# Patient Record
Sex: FEMALE | Race: WHITE | NOT HISPANIC OR LATINO | URBAN - METROPOLITAN AREA
[De-identification: names, ages, dates, MRNs, and addresses within clinical notes are randomized per-mention and may not be internally consistent; named-entity substitution may affect disease eponyms.]

---
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
## 2020-05-26 ENCOUNTER — APPOINTMENT (OUTPATIENT)
Dept: GASTROENTEROLOGY | Facility: CLINIC | Age: 20
End: 2020-05-26
Payer: COMMERCIAL

## 2020-05-26 DIAGNOSIS — Z80.41 FAMILY HISTORY OF MALIGNANT NEOPLASM OF OVARY: ICD-10-CM

## 2020-05-26 DIAGNOSIS — K60.2 ANAL FISSURE, UNSPECIFIED: ICD-10-CM

## 2020-05-26 PROCEDURE — 99203 OFFICE O/P NEW LOW 30 MIN: CPT | Mod: 95

## 2020-05-26 NOTE — HISTORY OF PRESENT ILLNESS
[FreeTextEntry1] : PCP = Dr. Zavala\par \par Finished semester, at [employer], econ major\par [place]\par \par h/o hemorrhoids 2019 - had severe rectal pain\par went to ER in [place] (while traveling), received an enema; saw blood with explosion\par \par On sertraline from Dr. Wanda To\par \par 9 days ago had bad stomach pains - epigastric area - doubled over\par started taking Prilosec - helped - also has TUMS and Mylanta\par was hard to function\par [place] and became lower abd pain\par saw BRBPR - only on tp\par still has blood on tp\par had BM now - painful, no blood - looked spongy, not firm, flaking\par \par takes Excedrin rarely for HAs\par \par Prior, would have regular BMs, occ constipation\par \par Presently feels fine; but had pain across the lower abd - resolved with BM\par \par No fevers or night sweats (did have night sweats with sertraline)\par Weight loss 2 summers ago to last year (from 125 - 103), maybe related to anxiety\par stable weight over the last year\par

## 2020-05-26 NOTE — REASON FOR VISIT
[Home] : at home, [unfilled] , at the time of the visit. [Initial Evaluation] : an initial evaluation [Medical Office: ([facility])___] : at the medical office located in

## 2020-05-26 NOTE — ASSESSMENT
[FreeTextEntry1] : \par lower abd pain prior to BMs for the past 9 days; began with epig pain that responded to PPIs\par Has painful BMs, sharp ("like spikes")\par \par Feels fine after BM\par Saw some BRB on tp; had hemorrhoid last year\par \par Try Miralax\par Sitz baths\par Anucort\par

## 2021-02-14 ENCOUNTER — TRANSCRIPTION ENCOUNTER (OUTPATIENT)
Age: 21
End: 2021-02-14

## 2021-11-18 ENCOUNTER — TRANSCRIPTION ENCOUNTER (OUTPATIENT)
Age: 21
End: 2021-11-18

## 2021-11-24 ENCOUNTER — APPOINTMENT (OUTPATIENT)
Dept: SURGERY | Facility: CLINIC | Age: 21
End: 2021-11-24
Payer: COMMERCIAL

## 2021-11-24 VITALS
WEIGHT: 110 LBS | OXYGEN SATURATION: 99 % | HEART RATE: 63 BPM | DIASTOLIC BLOOD PRESSURE: 67 MMHG | HEIGHT: 60.5 IN | BODY MASS INDEX: 21.04 KG/M2 | SYSTOLIC BLOOD PRESSURE: 114 MMHG | RESPIRATION RATE: 15 BRPM | TEMPERATURE: 97.8 F

## 2021-11-24 DIAGNOSIS — Z78.9 OTHER SPECIFIED HEALTH STATUS: ICD-10-CM

## 2021-11-24 DIAGNOSIS — K64.8 OTHER HEMORRHOIDS: ICD-10-CM

## 2021-11-24 DIAGNOSIS — K64.9 UNSPECIFIED HEMORRHOIDS: ICD-10-CM

## 2021-11-24 PROCEDURE — 46600 DIAGNOSTIC ANOSCOPY SPX: CPT

## 2021-11-24 PROCEDURE — 99244 OFF/OP CNSLTJ NEW/EST MOD 40: CPT | Mod: 25

## 2021-11-24 RX ORDER — SERTRALINE HYDROCHLORIDE 50 MG/1
50 TABLET, FILM COATED ORAL
Refills: 0 | Status: DISCONTINUED | COMMUNITY
End: 2021-11-24

## 2021-11-24 RX ORDER — POLYETHYLENE GLYCOL 3350 17 G/17G
17 POWDER, FOR SOLUTION ORAL DAILY
Qty: 1 | Refills: 5 | Status: DISCONTINUED | COMMUNITY
Start: 2020-05-26 | End: 2021-11-24

## 2021-11-24 RX ORDER — HYDROCORTISONE 2.5% 25 MG/G
2.5 CREAM TOPICAL
Qty: 1 | Refills: 0 | Status: ACTIVE | COMMUNITY
Start: 2021-11-24 | End: 1900-01-01

## 2021-11-24 RX ORDER — HYDROCORTISONE 2.5% 25 MG/G
2.5 CREAM TOPICAL
Qty: 1 | Refills: 0 | Status: DISCONTINUED | COMMUNITY
Start: 2020-05-26 | End: 2021-11-24

## 2021-12-28 PROBLEM — K64.8 INTERNAL AND EXTERNAL PROLAPSED HEMORRHOIDS: Status: ACTIVE | Noted: 2021-12-28

## 2021-12-28 NOTE — CONSULT LETTER
[Dear  ___] : Dear ~RODNEY, [Consult Letter:] : I had the pleasure of evaluating your patient, [unfilled]. [Please see my note below.] : Please see my note below. [Consult Closing:] : Thank you very much for allowing me to participate in the care of this patient.  If you have any questions, please do not hesitate to contact me. [Sincerely,] : Sincerely, [FreeTextEntry2] : Eladio Viramontes [FreeTextEntry3] : Marisel Marroquin M.D., F.A.C.S., F.GAGE.S.C.R.S.\par Assistant Professor of Surgery\par [employer] [facility]\par \par

## 2021-12-28 NOTE — PHYSICAL EXAM
[FreeTextEntry1] : This is a 21 year-old well-developed female in no apparent distress.\par \par HEENT normocephalic, anicteric, external ears normal bilaterally, EOMs intact.\par \par Cardiac - regular rate and rhythm.\par \par Abdomen soft, nontender, nondistended, no masses. No hepatosplenomegaly.\par \par No inguinal lymphadenopathy bilaterally.\par \par Examination of the perineum reveals small right anterolateral and left lateral external hemorrhoids. Digital rectal examination reveals normal sphincter tone. \par Anoscopy reveals moderately enlarged three-quadrant internal hemorrhoids, largest one in the right anterolateral area, all situated in the distal anal canal and not amenable to rubber banding.\par \par Neuro-cranial nerves grossly intact. Normal gait.\par \par Psychiatric-oriented to time place and person. Good understanding of conversation.

## 2021-12-28 NOTE — HISTORY OF PRESENT ILLNESS
[FreeTextEntry1] : Sarah is a 22 y/o female here for evaluation of anorectal pain and swelling. \par \par Patient feels  prolapsing tissue  noted  for the past month causing her discomfort. She is able to manually reduce the tissue back. The tissue has gotten larger in the past 3 weeks. Complains anal pruritus worse at night and uses Preparation H daily with little relief of symptoms. Has formed BM 2-3 day.   Denies bleeding, draining, and nausea and vomiting.  Denies abdominal pain. No family history of [place]/Rectal CA. Denies the use of anticoagulants. She has never had a colonoscopy. \tessy Was treated for fissure symptoms in 2020 by GI. \par \par

## 2021-12-28 NOTE — ASSESSMENT
[FreeTextEntry1] : 22 yo female with symptomatic external and internal prolapsing hemorrhoids. In order to treat her disease, she needs an excisional hemorrhoidectomy, which unfortunately has a painful recovery. I discussed the details, risks, benefits and alternatives of the procedure and expectations of recovery with the patient. \par Script for hemorrhoidal ointment provided. \par Pt to call my office if she wishes to proceed with the surgery.\par RTO as needed. \par

## 2022-08-17 ENCOUNTER — NON-APPOINTMENT (OUTPATIENT)
Age: 22
End: 2022-08-17

## 2023-06-05 ENCOUNTER — INPATIENT (INPATIENT)
Facility: HOSPITAL | Age: 23
LOS: 7 days | Discharge: HOME | End: 2023-06-13
Attending: PSYCHIATRY & NEUROLOGY | Admitting: PSYCHIATRY & NEUROLOGY
Payer: COMMERCIAL

## 2023-06-05 VITALS
DIASTOLIC BLOOD PRESSURE: 112 MMHG | OXYGEN SATURATION: 100 % | TEMPERATURE: 99 F | RESPIRATION RATE: 16 BRPM | SYSTOLIC BLOOD PRESSURE: 138 MMHG | HEART RATE: 104 BPM

## 2023-06-05 PROCEDURE — 99285 EMERGENCY DEPT VISIT HI MDM: CPT

## 2023-06-05 NOTE — ED PROVIDER NOTE - OBJECTIVE STATEMENT
This is a 23-year-old female with no pertinent past history with complaint of increased anxiety, pressured and rapid speech, flight of ideas. Patient arrived after her mental health counselor Any checked on her today and she advised family to bring her er due to possible she has her 1st manic episode.   Patient reports she believes her family think she is crazy but she is not. She identifies her  stressors as her uncle passed on May 29 this year. 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 She sees the "lights" she loves everybody. She is a love bug and people do not understand that. She endorses hx of self injury at 7 th grade ( via cutting), she wanted to relive mental pain, she was frustrated at the time. She was confused about her sexuality. At this time she is between relationship with a woman. Denies any si intent or plan, hi, ah, vh. Reports less sleep about 3 hrs during the night. Usually she sleeps 6-10 hrs.

## 2023-06-05 NOTE — ED BEHAVIORAL HEALTH ASSESSMENT NOTE - NSBHROSSYSTEMS_PSY_ALL_CORE
Psychiatric t currently denied experiencing any headaches; has no dizziness, no blurring of vision; no sorethroat; no cough, no fever. no chest pains, no SOB, no palpitations, no abdominal pains, no nausea/ vomiting/ diarrhea, no dysuria, no hesitancy, no arthralgia/ no pruritus. denied any muscle/ joint pains/Psychiatric

## 2023-06-05 NOTE — ED PROVIDER NOTE - CLINICAL SUMMARY MEDICAL DECISION MAKING FREE TEXT BOX
This is a 23-year-old female with no pertinent past history with complaint of increased anxiety, pressured and rapid speech, flight of ideas. Patient arrived after her mental health counselor Any checked on her today and she advised family to bring her er due to possible she has her 1st manic episode.   Patient reports she believes her family think she is crazy but she is not. She identifies her  stressors as her uncle passed on May 29 this year. 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 She sees the "lights" she loves everybody. She is a love bug and people do not understand that. She endorses hx of self injury at 7 th grade ( via cutting), she wanted to relive mental pain, she was frustrated at the time. She was confused about her sexuality. At this time she is between relationship with a woman. Denies any si intent or plan, hi, ah, vh. Reports less sleep about 3 hrs during the night. Usually she sleeps 6-10 hrs.  labs, psych consult

## 2023-06-05 NOTE — ED BEHAVIORAL HEALTH ASSESSMENT NOTE - NSBHATTESTCOMMENTATTENDFT_PSY_A_CORE
23/F with reported hx of anxiety; no reported hx of SA and hx of polysubstance abuse. now presents due to concern for reyna manifesting as agitated behavior.      at this time, is exhibiting manic symptoms in the form of illogicality/ FOI/ tangentiality; she is severely affectively dysregulated and remains unpredictable. is delusional (grandiose, referential delusions); has magical thinking; with poor insight and impaired judgement. said symptoms causing severe functional impairment.  Pt will need psych admission aimed at stabilization     Recommendations:   - start lithium 900mg PO daily. titrate. obtain rpt Li level later    - PRN PO/IM haloperidol 5mg, lorazepam 2mg, and diphenhydramine 50mg PO/IM q6hrs for agitation  - once medically cleared, facilitate transfer to [facility]

## 2023-06-05 NOTE — ED BEHAVIORAL HEALTH ASSESSMENT NOTE - RISK ASSESSMENT
Acute: impulsivity; affective dysregulation with lability; increased activity and reckless behaviors  Chronic: daily substance use (marijuana and alcohol); history of NSSIB  Protective: supportive family; residential stability; employment; denial of current and historical SIIP, HIIP, and AVH

## 2023-06-05 NOTE — ED BEHAVIORAL HEALTH NOTE - BEHAVIORAL HEALTH NOTE
As per request of provider, writer contacted patient’s mother Susanna Huff [phone] to obtain collateral information. The following information is per mother.    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 Mother reports patient found out today that the patient has sent around 64 text messages since Saturday night at midnight to a coworker that she has a crush on. Mothe reports patient has made statements being enlightened and mother describes the text as a stream of conscious. Mother reports patients texed included the patient stating “I have been awake since midnight thinking about you”, talks about seeing the #’s 7 and 5 and that they are signs from angels. Mother reports patient states in thetext that she has nothing to fear, is talking about god and saying her uncle jono who recently passed has been talking to her since she passed. Mother reports the coworker told the mother today about the tetxs. Mother reports patient went to work today for a half of day and said she wasn’t going into work tomorrow. Mother reports patient says she has not slept since Saturday. She says patient’s hygiene and appetite are at baseline. Mother reports patient was agitated today yelling at family during the conversations. Mother reports beign attacked verbally by the patient on the way here. Mother reports at baseline, the patient is punctual, friendly and not verbally aggressive. Mother reports patient smokes marijuana often and has been dirnking a 6 pack of beer a day for the past 1-2 weeks. Mother reports patient has a hx of self harm but is unaware of any recent self. Mother reports patient has no prior psych admissions. Mother reports patient’s medication includes Zoloft and Wellbutrin. Mother is unsure of dosage. patient’s psychiatrist is Dr lopez ([phone]) and therapist is Any Rivera *[phone]). Mother reports patient has no medical problems. Patient is covid vaccinated and has no recent travel or exposure. Patient has no family hx of mental illness or addiction. Patient does not have access to firearms or weapons. Mother in waiting room awaiting dispo. As per request of provider, writer contacted patient’s mother Susanna Huff [phone] to obtain collateral information. The following information is per mother.    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 Mother reports patient found out today that the patient has sent around 64 text messages since Saturday night at midnight to a coworker that she has a crush on. Mother reports patient has made statements being enlightened and mother describes the text as a stream of conscious. Mother reports patients texed included the patient stating “I have been awake since midnight thinking about you”, talks about seeing the #’s 7 and 5 and that they are signs from angels. Mother reports patient states in the text that she has nothing to fear, is talking about god and saying her uncle jono who recently passed has been talking to her since she passed. Mother reports the coworker told the mother today about the texts. Mother reports patient went to work today for a half of day and said she wasn’t going into work tomorrow. Mother reports patient says she has not slept since Saturday. She says patient’s hygiene and appetite are at baseline. Mother reports patient was agitated today yelling at family during the conversations. Mother reports being attacked verbally by the patient on the way here. Mother reports at baseline, the patient is punctual, friendly and not verbally aggressive. Mother reports patient smokes marijuana often and has been drinking a 6 pack of beer a day for the past 1-2 weeks. Mother reports patient has a hx of self harm but is unaware of any recent self. Mother reports patient has no prior psych admissions. Mother reports patient’s medication includes Zoloft and Wellbutrin. Mother is unsure of dosage. patient’s psychiatrist is Dr lopez ([phone]) and therapist is Any Rivera ([phone]). Mother reports patient has no medical problems. Patient is covid vaccinated and has no recent travel or exposure. Patient has no family hx of mental illness or addiction. Patient does not have access to firearms or weapons. Mother in waiting room awaiting disposition.   Writer informed mother that patient would be admitted and is boarding due to bed availability.    As per request of provider, writer contacted  Any Rivera (mental health counselor) [phone] for additional collateral information. Writer left a voicemail requesting return call.

## 2023-06-05 NOTE — ED BEHAVIORAL HEALTH ASSESSMENT NOTE - NSSUICPROTFACT_PSY_ALL_CORE
Identifies reasons for living/Supportive social network of family or friends/Engaged in work or school/Positive therapeutic relationships/Beloved pets

## 2023-06-05 NOTE — ED BEHAVIORAL HEALTH ASSESSMENT NOTE - UNABLE TO CARE FOR SELF DETAILS
no
lack of insight into her condition; decreased sleep with increased reckless behavior putting the patient at risk

## 2023-06-05 NOTE — ED PROVIDER NOTE - PROGRESS NOTE DETAILS
Dr. Sam Martinez DO (ED ATTENDING):  patient is medically cleared for admission to inpatient psych and currently boarding at this time for a [facility] bed

## 2023-06-05 NOTE — ED BEHAVIORAL HEALTH ASSESSMENT NOTE - SUMMARY
This is a 24y/o woman with a past psychiatric history of anxiety presenting with reyna.     While in the ED, the patient has been calm and cooperative, although emotionally labile with irritability. On exam, she is hyperverbal, pressured, grandiose, labile, and impulsive, and is experiencing referential delusions, magical thinking, decreased need for sleep, and increased goal-directed behavior, all of which has lasted for at least the last week. Given this, she meets criteria for bipolar I disorder, current manic episode. In her current state, she presents a risk of harm to herself and others and, therefore, warrants inpatient psychiatric admission for acute stabilization and medication management.     Plan:  - admit 9.27  - start lithium 900mg PO daily pending CMP for acute reyna  - PRN PO/IM haloperidol 5mg, lorazepam 2mg, and diphenhydramine 50mg for agitation This is a 24y/o woman with a past psychiatric history of anxiety presenting with reyna.     While in the ED, the patient has been calm and cooperative, although emotionally labile with irritability. On exam, she is hyperverbal, pressured, grandiose, labile, and impulsive, and is experiencing referential delusions, magical thinking, decreased need for sleep, and increased goal-directed behavior, all of which has lasted for at least the last week. Given this, she meets criteria for bipolar I disorder, current manic episode. In her current state, she presents a risk of harm to herself and others and, therefore, warrants inpatient psychiatric admission for acute stabilization and medication management.     Plan:  - admit 9.27  - start lithium 900mg PO daily  for acute reyna  - PRN PO/IM haloperidol 5mg, lorazepam 2mg, and diphenhydramine 50mg for agitation

## 2023-06-05 NOTE — ED BEHAVIORAL HEALTH ASSESSMENT NOTE - DESCRIPTION
lives with family in [place], graduated college in 2021, started current job in Mar2023 none known awaiting collection of labs awaiting collection of labs. no active SI or HI. not delirious. preoccupied with discharge.

## 2023-06-05 NOTE — ED BEHAVIORAL HEALTH ASSESSMENT NOTE - HPI (INCLUDE ILLNESS QUALITY, SEVERITY, DURATION, TIMING, CONTEXT, MODIFYING FACTORS, ASSOCIATED SIGNS AND SYMPTOMS)
This is a 24y/o woman with no past medical history and a past psychiatric history of anxiety presenting at the behest of her therapist for concern of reyna.     The patient reports that she has been having difficulties since Nov2022 when the stress of her job was causing her high levels of anxiety. 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 She then started working at [employer] and reports that her situation improved slightly but that she is now in love with a coworker (11 years older,  with children) and endorses a strong connection with this person after knowing them for a few days. She states that a few weeks ago she began having difficulties sleeping, falling asleep at 10 or 11pm and waking up at 2am feeling energetic (at first she said this coincided with the death of her uncle on 29May2023 but then extended the timeline). She reports consuming more alcohol than usual, "craving the buzz." She says that since that time she has been "enlightened," receiving special messages from the environment that others don't  ("Cardinals have special meaning, and I've been seeing them everywhere;" "It's like a spiritual experience"). She reports smoking marijuana daily (2-3 bowls) for the last five years and vaping nicotine. She reports use of cocaine in college but denies current and historical use of other substances. She reports journaling all the time, much more than her usual state. She also reports that everyone around her "notices that I'm different and thinks I'm crazy." She reports that she has not had a sex drive since November. She reports that she started to spend more money than usual since March. She denies current and historical SIIP, HIIP, and AVH.    She states that she has been seeing psychiatrists and therapists off and on since 7th grade when she began having "emotional difficulties." At that time, she started cutting herself with razors, stopping a few years ago. Since Dec202, she has been seeing Dr. Wanda To, who started her on sertraline for anxiety. She was titrated to 100mg daily and reports taking it consistently. She also reports starting bupropion but has not taken it for a few weeks because her prescription ran out. She states that she was hospitalized at an inpatient psychiatric facility ([place]) in 2019 for "anxiety." She says that the circumstances of that hospitalization were not similar to her current situation. She denies a family history of psychiatric conditions. This is a 22y/o woman with no past medical history and a past psychiatric history of anxiety presenting at the behest of her therapist for concern of ryena.     The patient reports that she has been having difficulties since Nov2022 when the stress of her job was causing her high levels of anxiety. 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 She then started working at [employer] and reports that her situation improved slightly but that she is now in love with a coworker (11 years older,  with children) and endorses a strong connection with this person after knowing them for a few days. She states that a few weeks ago she began having difficulties sleeping, falling asleep at 10 or 11pm and waking up at 2am feeling energetic (at first she said this coincided with the death of her uncle on 29May2023 but then extended the timeline). She reports consuming more alcohol than usual, "craving the buzz." She says that since that time she has been "enlightened," receiving special messages from the environment that others don't  ("Cardinals have special meaning, and I've been seeing them everywhere;" "It's like a spiritual experience"). She reports smoking marijuana daily (2-3 bowls) for the last five years and vaping nicotine. She reports use of cocaine in college but denies current and historical use of other substances. She reports journaling all the time, much more than her usual state. She also reports that everyone around her "notices that I'm different and thinks I'm crazy." She reports that she has not had a sex drive since November. She reports that she started to spend more money than usual since March. She denies current and historical SIIP, HIIP, and AVH.    She states that she has been seeing psychiatrists and therapists off and on since 7th grade when she began having "emotional difficulties." At that time, she started cutting herself with razors, stopping a few years ago. Since Dec202, she has been seeing Dr. Wanda To, who started her on sertraline for anxiety. She was titrated to 100mg daily and reports taking it consistently. She also reports starting bupropion but has not taken it for a few weeks because her prescription ran out. She states that she was hospitalized at an inpatient psychiatric facility ([place]) in 2019 for "anxiety." She says that the circumstances of that hospitalization were not similar to her current situation. She denies a family history of psychiatric conditions. She does not want inpatient admission.    Collateral found in [facility] Note.

## 2023-06-05 NOTE — ED ADULT TRIAGE NOTE - CHIEF COMPLAINT QUOTE
sent in by mental health counselor for manic symptoms during visit today. Pt is hyperverbal, pressured speech, and states "I am god-like figure". Hx Bipolar

## 2023-06-06 DIAGNOSIS — F31.10 BIPOLAR DISORDER, CURRENT EPISODE MANIC WITHOUT PSYCHOTIC FEATURES, UNSPECIFIED: ICD-10-CM

## 2023-06-06 DIAGNOSIS — F33.9 MAJOR DEPRESSIVE DISORDER, RECURRENT, UNSPECIFIED: ICD-10-CM

## 2023-06-06 LAB
ALBUMIN SERPL ELPH-MCNC: 4.8 G/DL — SIGNIFICANT CHANGE UP (ref 3.3–5)
ALP SERPL-CCNC: 59 U/L — SIGNIFICANT CHANGE UP (ref 40–120)
ALT FLD-CCNC: 21 U/L — SIGNIFICANT CHANGE UP (ref 4–33)
ANION GAP SERPL CALC-SCNC: 15 MMOL/L — HIGH (ref 7–14)
APAP SERPL-MCNC: <10 UG/ML — LOW (ref 15–25)
AST SERPL-CCNC: 25 U/L — SIGNIFICANT CHANGE UP (ref 4–32)
BASOPHILS # BLD AUTO: 0.08 K/UL — SIGNIFICANT CHANGE UP (ref 0–0.2)
BASOPHILS NFR BLD AUTO: 0.9 % — SIGNIFICANT CHANGE UP (ref 0–2)
BILIRUB SERPL-MCNC: 0.5 MG/DL — SIGNIFICANT CHANGE UP (ref 0.2–1.2)
BUN SERPL-MCNC: 11 MG/DL — SIGNIFICANT CHANGE UP (ref 7–23)
CALCIUM SERPL-MCNC: 9.2 MG/DL — SIGNIFICANT CHANGE UP (ref 8.4–10.5)
CHLORIDE SERPL-SCNC: 103 MMOL/L — SIGNIFICANT CHANGE UP (ref 98–107)
CO2 SERPL-SCNC: 20 MMOL/L — LOW (ref 22–31)
CREAT SERPL-MCNC: 0.72 MG/DL — SIGNIFICANT CHANGE UP (ref 0.5–1.3)
EGFR: 120 ML/MIN/1.73M2 — SIGNIFICANT CHANGE UP
EOSINOPHIL # BLD AUTO: 0.05 K/UL — SIGNIFICANT CHANGE UP (ref 0–0.5)
EOSINOPHIL NFR BLD AUTO: 0.5 % — SIGNIFICANT CHANGE UP (ref 0–6)
ETHANOL SERPL-MCNC: 49 MG/DL — HIGH
FLUAV AG NPH QL: SIGNIFICANT CHANGE UP
FLUBV AG NPH QL: SIGNIFICANT CHANGE UP
GLUCOSE SERPL-MCNC: 88 MG/DL — SIGNIFICANT CHANGE UP (ref 70–99)
HCG SERPL-ACNC: <1 MIU/ML — SIGNIFICANT CHANGE UP
HCT VFR BLD CALC: 41.4 % — SIGNIFICANT CHANGE UP (ref 34.5–45)
HGB BLD-MCNC: 13.3 G/DL — SIGNIFICANT CHANGE UP (ref 11.5–15.5)
IANC: 6.05 K/UL — SIGNIFICANT CHANGE UP (ref 1.8–7.4)
IMM GRANULOCYTES NFR BLD AUTO: 0.2 % — SIGNIFICANT CHANGE UP (ref 0–0.9)
LYMPHOCYTES # BLD AUTO: 2.38 K/UL — SIGNIFICANT CHANGE UP (ref 1–3.3)
LYMPHOCYTES # BLD AUTO: 25.6 % — SIGNIFICANT CHANGE UP (ref 13–44)
MCHC RBC-ENTMCNC: 27.2 PG — SIGNIFICANT CHANGE UP (ref 27–34)
MCHC RBC-ENTMCNC: 32.1 GM/DL — SIGNIFICANT CHANGE UP (ref 32–36)
MCV RBC AUTO: 84.7 FL — SIGNIFICANT CHANGE UP (ref 80–100)
MONOCYTES # BLD AUTO: 0.7 K/UL — SIGNIFICANT CHANGE UP (ref 0–0.9)
MONOCYTES NFR BLD AUTO: 7.5 % — SIGNIFICANT CHANGE UP (ref 2–14)
NEUTROPHILS # BLD AUTO: 6.05 K/UL — SIGNIFICANT CHANGE UP (ref 1.8–7.4)
NEUTROPHILS NFR BLD AUTO: 65.3 % — SIGNIFICANT CHANGE UP (ref 43–77)
NRBC # BLD: 0 /100 WBCS — SIGNIFICANT CHANGE UP (ref 0–0)
NRBC # FLD: 0 K/UL — SIGNIFICANT CHANGE UP (ref 0–0)
PLATELET # BLD AUTO: 388 K/UL — SIGNIFICANT CHANGE UP (ref 150–400)
POTASSIUM SERPL-MCNC: 3.7 MMOL/L — SIGNIFICANT CHANGE UP (ref 3.5–5.3)
POTASSIUM SERPL-SCNC: 3.7 MMOL/L — SIGNIFICANT CHANGE UP (ref 3.5–5.3)
PROT SERPL-MCNC: 7.8 G/DL — SIGNIFICANT CHANGE UP (ref 6–8.3)
RBC # BLD: 4.89 M/UL — SIGNIFICANT CHANGE UP (ref 3.8–5.2)
RBC # FLD: 12.8 % — SIGNIFICANT CHANGE UP (ref 10.3–14.5)
RSV RNA NPH QL NAA+NON-PROBE: SIGNIFICANT CHANGE UP
SALICYLATES SERPL-MCNC: <0.3 MG/DL — LOW (ref 15–30)
SARS-COV-2 RNA SPEC QL NAA+PROBE: SIGNIFICANT CHANGE UP
SODIUM SERPL-SCNC: 138 MMOL/L — SIGNIFICANT CHANGE UP (ref 135–145)
TOXICOLOGY SCREEN, DRUGS OF ABUSE, SERUM RESULT: SIGNIFICANT CHANGE UP
TSH SERPL-MCNC: 4.62 UIU/ML — HIGH (ref 0.27–4.2)
WBC # BLD: 9.28 K/UL — SIGNIFICANT CHANGE UP (ref 3.8–10.5)
WBC # FLD AUTO: 9.28 K/UL — SIGNIFICANT CHANGE UP (ref 3.8–10.5)

## 2023-06-06 RX ORDER — DIPHENHYDRAMINE HCL 50 MG
50 CAPSULE ORAL ONCE
Refills: 0 | Status: DISCONTINUED | OUTPATIENT
Start: 2023-06-06 | End: 2023-06-13

## 2023-06-06 RX ORDER — LITHIUM CARBONATE 300 MG/1
900 TABLET, EXTENDED RELEASE ORAL DAILY
Refills: 0 | Status: DISCONTINUED | OUTPATIENT
Start: 2023-06-06 | End: 2023-06-07

## 2023-06-06 RX ORDER — HALOPERIDOL DECANOATE 100 MG/ML
5 INJECTION INTRAMUSCULAR EVERY 6 HOURS
Refills: 0 | Status: DISCONTINUED | OUTPATIENT
Start: 2023-06-06 | End: 2023-06-13

## 2023-06-06 RX ORDER — HALOPERIDOL DECANOATE 100 MG/ML
5 INJECTION INTRAMUSCULAR ONCE
Refills: 0 | Status: DISCONTINUED | OUTPATIENT
Start: 2023-06-06 | End: 2023-06-13

## 2023-06-06 RX ORDER — DIPHENHYDRAMINE HCL 50 MG
50 CAPSULE ORAL EVERY 6 HOURS
Refills: 0 | Status: DISCONTINUED | OUTPATIENT
Start: 2023-06-06 | End: 2023-06-13

## 2023-06-06 RX ORDER — ACETAMINOPHEN 500 MG
650 TABLET ORAL EVERY 6 HOURS
Refills: 0 | Status: DISCONTINUED | OUTPATIENT
Start: 2023-06-06 | End: 2023-06-13

## 2023-06-06 RX ORDER — MAGNESIUM HYDROXIDE 400 MG/1
30 TABLET, CHEWABLE ORAL DAILY
Refills: 0 | Status: DISCONTINUED | OUTPATIENT
Start: 2023-06-06 | End: 2023-06-13

## 2023-06-06 RX ORDER — NICOTINE POLACRILEX 2 MG
2 GUM BUCCAL
Refills: 0 | Status: DISCONTINUED | OUTPATIENT
Start: 2023-06-06 | End: 2023-06-13

## 2023-06-06 RX ORDER — HALOPERIDOL DECANOATE 100 MG/ML
5 INJECTION INTRAMUSCULAR ONCE
Refills: 0 | Status: COMPLETED | OUTPATIENT
Start: 2023-06-06 | End: 2023-06-06

## 2023-06-06 RX ADMIN — HALOPERIDOL DECANOATE 5 MILLIGRAM(S): 100 INJECTION INTRAMUSCULAR at 03:18

## 2023-06-06 RX ADMIN — Medication 2 MILLIGRAM(S): at 03:18

## 2023-06-06 NOTE — ED ADULT NURSE NOTE - OBJECTIVE STATEMENT
Received pt in room . A&Ox4, ambulatory at baseline, presents with increased anxiety, pressured and rapid speech, flight of ideas. states she believes her family think she is crazy but she is not since uncle passed on May 29 this year. states she is a "love bug" and people do not understand that. endorses hx of self injury at 7 th grade (via cutting), she wanted to relive mental pain, she was frustrated at the time. Reports less sleep about 3 hrs during the night, sleeps 6-10 hrs. VSS. RR even and unlabored. Labs sent. Pt denies SI/HI. Pt denies visual or auditory hallucinations or other complaints. Pt belongings checked and secured by staff.  Pt checked by metal detector.  Pt changed into gown and scrubs. Awaiting further orders from provider.

## 2023-06-06 NOTE — ED BEHAVIORAL HEALTH NOTE - BEHAVIORAL HEALTH NOTE
received a call from Susanna Huff  pt's mother who requested an update.   informed her pt is calm at this time and she will receive a call with an update once disposition is determined.

## 2023-06-06 NOTE — ED ADULT NURSE REASSESSMENT NOTE - NS ED NURSE REASSESS COMMENT FT1
pt became increasingly manic, hysterically crying, demanding to see mom, "I don't not belong here, I'm so anxious and scared, get me out of here", trying to hide behind staff members and security, pt accepting of IM medication without physical restraint, safety measures in place, care ongoing

## 2023-06-06 NOTE — ED BEHAVIORAL HEALTH PROGRESS NOTE - NSBHATTESTCOMMENTATTENDFT_PSY_A_CORE
This is a 22y/o woman with a past psychiatric history of anxiety presenting with reyna. While in the ED, the patient has been calm and cooperative, although emotionally labile with irritability.   On exam, patient is presenting manic, hyperverbal, pressured, grandiose, labile, and impulsive, and is experiencing referential delusions, magical thinking, decreased need for sleep, and increased goal-directed behavior, all of which has lasted for at least the last week.   Patient meets criteria for bipolar I disorder, current manic episode. In her current state, she presents a risk of harm to herself and others and, therefore, warrants inpatient psychiatric admission for acute stabilization and medication management. Patient is requesting to be admitted, but she is unable to sign in voluntarily because she lacks capacity to do so, she will admitted involuntarily.

## 2023-06-06 NOTE — BH PATIENT PROFILE - FUNCTIONAL ASSESSMENT - DAILY ACTIVITY ASSESSMENT TYPE
Called patient reported the below information.     10/25/2022 8:30 AM Priyanka Greene MD  DERMATOLOGY [phone]        Display Notes    pdt          Admission

## 2023-06-06 NOTE — ED ADULT NURSE REASSESSMENT NOTE - NS ED NURSE REASSESS COMMENT FT1
Received report from naina RN. Pt is resting with eyes closed. VS as noted. RR even and unlabored. Safety measures in place. Care ongoing

## 2023-06-06 NOTE — ED BEHAVIORAL HEALTH PROGRESS NOTE - CASE SUMMARY/FORMULATION (CLEARLY DOCUMENT RATIONALE FOR DISPOSITION CHANGE)
This is a 24y/o woman with a past psychiatric history of anxiety presenting with reyna.     Given this, she meets criteria for bipolar I disorder, current manic episode. In her current state, she presents a risk of harm to herself and others and, therefore, warrants inpatient psychiatric admission for acute stabilization and medication management.

## 2023-06-06 NOTE — ED BEHAVIORAL HEALTH PROGRESS NOTE - SUMMARY
23 year old female with increased goal-directed behavior, decreased need for sleep,  This is a 24y/o woman with a past psychiatric history of anxiety presenting with reyna.     While in the ED, the patient has been calm and cooperative, although emotionally labile with irritability. On exam, she is hyperverbal, pressured, grandiose, labile, and impulsive, and is experiencing referential delusions, magical thinking, decreased need for sleep, and increased goal-directed behavior, all of which has lasted for at least the last week. Given this, she meets criteria for bipolar I disorder, current manic episode. In her current state, she presents a risk of harm to herself and others and, therefore, warrants inpatient psychiatric admission for acute stabilization and medication management.

## 2023-06-06 NOTE — ED BEHAVIORAL HEALTH PROGRESS NOTE - NSBHMSEMUSCLE_PSY_A_CORE
Call received from Tenzin Kendall with [facility] to request verbal order to continue to see patient with HHA 2x week for 2 weeks. Verbal order provided. Jose Currie verbalized patient should be discharged from [facility] on August 18th, and patient wishes to return to hospice care at that time (because pt reports she needs the [facility] services). I verbalized understanding and explained that going back to hospice would not necessarily be an option, but I would encourage discussion with Rancho Chopra NP during provider's visit.
Unable to assess

## 2023-06-06 NOTE — ED BEHAVIORAL HEALTH PROGRESS NOTE - RISK ASSESSMENT
23 year old female with increased goal-directed behavior, decreased need for sleep, pressured speech, impulsive, experiencing referential delusions, magical thinking all of which started last week.  Given her current state, she warrants inpatient psychiatric admission for acute stabilization and medication management.  This is a 24y/o woman with a past psychiatric history of anxiety presenting with reyna.     While in the ED, the patient has been calm and cooperative, although emotionally labile with irritability. On exam, she is currently sleeping after being given Haldol  mg and Lorazepam 2 mg.  She has been hyperverbal, pressured, grandiose, labile, and impulsive, and experienced referential delusions, magical thinking, decreased need for sleep, and increased goal-directed behavior, all of which has lasted for at least the last week. Given this, she meets criteria for bipolar I disorder, current manic episode. In her current state, she presents a risk of harm to herself and others and, therefore, warrants inpatient psychiatric admission for acute stabilization and medication management.

## 2023-06-06 NOTE — BH PATIENT PROFILE - NSSBIRTALCPOSREINDET_GEN_A_CORE
Encouraged pt to the dangers of weekly drinking in combination with taking medication and talked about AA programs available in the community.

## 2023-06-07 LAB
A1C WITH ESTIMATED AVERAGE GLUCOSE RESULT: 4.7 % — SIGNIFICANT CHANGE UP (ref 4–5.6)
CHOLEST SERPL-MCNC: 170 MG/DL — SIGNIFICANT CHANGE UP
ESTIMATED AVERAGE GLUCOSE: 88 — SIGNIFICANT CHANGE UP
HDLC SERPL-MCNC: 110 MG/DL — SIGNIFICANT CHANGE UP
LIPID PNL WITH DIRECT LDL SERPL: 37 MG/DL — SIGNIFICANT CHANGE UP
NON HDL CHOLESTEROL: 60 MG/DL — SIGNIFICANT CHANGE UP
T4 FREE SERPL-MCNC: 1.5 NG/DL — SIGNIFICANT CHANGE UP (ref 0.9–1.8)
TRIGL SERPL-MCNC: 113 MG/DL — SIGNIFICANT CHANGE UP
TSH SERPL-MCNC: 1.75 UIU/ML — SIGNIFICANT CHANGE UP (ref 0.27–4.2)

## 2023-06-07 RX ORDER — LITHIUM CARBONATE 300 MG/1
300 TABLET, EXTENDED RELEASE ORAL
Refills: 0 | Status: DISCONTINUED | OUTPATIENT
Start: 2023-06-07 | End: 2023-06-09

## 2023-06-07 RX ORDER — LITHIUM CARBONATE 300 MG/1
300 TABLET, EXTENDED RELEASE ORAL ONCE
Refills: 0 | Status: COMPLETED | OUTPATIENT
Start: 2023-06-07 | End: 2023-06-07

## 2023-06-07 RX ADMIN — LITHIUM CARBONATE 300 MILLIGRAM(S): 300 TABLET, EXTENDED RELEASE ORAL at 21:14

## 2023-06-07 RX ADMIN — LITHIUM CARBONATE 300 MILLIGRAM(S): 300 TABLET, EXTENDED RELEASE ORAL at 15:28

## 2023-06-07 RX ADMIN — Medication 2 MILLIGRAM(S): at 15:26

## 2023-06-07 NOTE — DIETITIAN INITIAL EVALUATION ADULT - PERTINENT LABORATORY DATA
06-06    138  |  103  |  11  ----------------------------<  88  3.7   |  20<L>  |  0.72    Ca    9.2      06 Jun 2023 00:01    TPro  7.8  /  Alb  4.8  /  TBili  0.5  /  DBili  x   /  AST  25  /  ALT  21  /  AlkPhos  59  06-06  A1C with Estimated Average Glucose Result: 4.7 % (06-07-23 @ 08:00)    Lipid Panel: Date/Time: 06-07-23 @ 08:00  Cholesterol, Serum: 170  Direct LDL: --  HDL Cholesterol, Serum: 110  Total Cholesterol/HDL Ration Measurement: --  Triglycerides, Serum: 113

## 2023-06-07 NOTE — BH INPATIENT PSYCHIATRY ASSESSMENT NOTE - NSBHCHARTREVIEWVS_PSY_A_CORE FT
Vital Signs Last 24 Hrs  T(C): 37 (06-07-23 @ 08:35), Max: 37.1 (06-06-23 @ 13:41)  T(F): 98.6 (06-07-23 @ 08:35), Max: 98.7 (06-06-23 @ 13:41)  HR: --  BP: --  BP(mean): --  RR: 19 (06-07-23 @ 08:35) (19 - 20)  SpO2: 99% (06-07-23 @ 08:35) (99% - 99%)    Orthostatic VS  06-07-23 @ 08:35  Lying BP: --/-- HR: --  Sitting BP: 127/87 HR: 70  Standing BP: 133/78 HR: 76  Site: --  Mode: --  Orthostatic VS  06-06-23 @ 13:41  Lying BP: --/-- HR: --  Sitting BP: 116/74 HR: 80  Standing BP: 120/84 HR: 95  Site: --  Mode: --   Vital Signs Last 24 Hrs  T(C): 37 (06-07-23 @ 08:35), Max: 37 (06-07-23 @ 08:35)  T(F): 98.6 (06-07-23 @ 08:35), Max: 98.6 (06-07-23 @ 08:35)  HR: --  BP: --  BP(mean): --  RR: 19 (06-07-23 @ 08:35) (19 - 19)  SpO2: 99% (06-07-23 @ 08:35) (99% - 99%)    Orthostatic VS  06-07-23 @ 08:35  Lying BP: --/-- HR: --  Sitting BP: 127/87 HR: 70  Standing BP: 133/78 HR: 76  Site: --  Mode: --  Orthostatic VS  06-06-23 @ 13:41  Lying BP: --/-- HR: --  Sitting BP: 116/74 HR: 80  Standing BP: 120/84 HR: 95  Site: --  Mode: --

## 2023-06-07 NOTE — BH SOCIAL WORK INITIAL PSYCHOSOCIAL EVALUATION - NSCMSPTSTRENGTHS_PSY_ALL_CORE
Compliance to treatment/Financial stability/Intact family/Motivated/Self confidence/Successful coping history

## 2023-06-07 NOTE — BH SOCIAL WORK INITIAL PSYCHOSOCIAL EVALUATION - OTHER PAST PSYCHIATRIC HISTORY (INCLUDE DETAILS REGARDING ONSET, COURSE OF ILLNESS, INPATIENT/OUTPATIENT TREATMENT)
Pt is a 23 yr old female living with her mother, father, and sister with past psychiatric history of depression, one past psychiatric hospitalization at [facility] for depression, she currently takes Zoloft 100mg and was prescribed Wellbutrin but did not renew the prescription once it ended. she denies a any suicidal and homicidal ideation and admits to using alcohol, marijuana, and nicotine products, she has a past medical history of broken ankle and wrist from playing soccer and basketball.   Pt says that her behavior started around May 29th 2023 when she found out that her uncle . Even though he had a history of cancer she said that his death was unexpected. This lead her to locking herself in her room, drinking beer, screaming and playing loud music. She says that on Saturday her mom burst into her room and told her she had to go to the hospital even though she did not want to go and her father did not think she needed to do. She says that after her uncle's death she felt like everything suddenly "made sense", she gets 3-4 hours of sleep every night but did not feel tired and felt energetic. Pt says that during this time she felt herself taking on a lot of activities at one time and felt like she received signs from her  uncle. She saw a cardinal near his grave and understood this to be a sign from him. She says that she does not feel easily distracted and she did not feel grandiose, she just felt like she acquired new knowledge and "wanted to share it with everyone". she felt like after her uncle's death everything started to make sense.  She does not have any suicidal or homicidal ideation, increased sexual desire or activity, and denies any increased impulsivity like spending a lot of money.  Her past hospitalization occurred at [facility] in 2019. She says she felt like a shell of herself leading to the hospitalization in 2019. She had depressed mood, loss of interest in things she once liked doing, she did not see any sleep or appetite changes. She says she did not have any suicidal or homicidal ideation but that she was overwhelmed with school and needed up having to take a break from school. She says that she felt similarly when she took a break from her girlfriend Francheska from July - 2021 who she had been dating since Aug. 2018 and when she took medical leave from her job in Dec. 2022 because her work responsibilities became overwhelming and she felt that she was not qualified to do the level of work they gave her. She says that she started a new job with [employer] in 2023 and that she loved it there. She said that even though she is dating Francheska she fell in love with her co worker Jacquelyn. Her crush on Jacquelyn began in 2023. She says that She told Jacquelyn of her romantic feelings on Friday but Jacquelyn told her she (Jacquelyn) was  and they could not be together. She says that she did not feel bad about this and it did not impact her too much since Jacquelyn is , with children and is older than her. She says that she has never felt periods of euphoria, decreased need for sleep, increased starting of several activities or people sending her signs before.    She states that she has been seeing psychiatrists and therapists off and on since 7th grade when she began having "emotional difficulties." At that time, she started cutting herself with razors, stopping a few years ago. Since Dec 2002, she has been seeing Dr. Wanda To, who started her on sertraline for anxiety. She was titrated to 100mg daily and reports taking it consistently. She also reports starting bupropion but has not taken it for a few weeks because her prescription ran out.     Per pts mother -she reports on day of admit she (mom) found out that the patient had sent around 64 text messages since Saturday night at midnight to a coworker that she has a crush on. Mother reports patient has made statements being enlightened and mother describes the text as a stream of conscious. Mother reports patients texed included the patient stating “I have been awake since midnight thinking about you”, talks about seeing the #’s 7 and 5 and that they are signs from angels. Mother reports patient states in the text that she has nothing to fear, is talking about god and saying her uncle jono who recently passed has been talking to her since she passed. Mother reports the coworker told the mother today about the texts. Mother reports patient went to work today for a half of day and said she wasn’t going into work tomorrow. Mother reports patient says she has not slept since Saturday. She says patient’s hygiene and appetite are at baseline. Mother reports patient was agitated today yelling at family during the conversations. Mother reports being attacked verbally by the patient on the way here. Mother reports at baseline, the patient is punctual, friendly and not verbally aggressive. Mother reports patient smokes marijuana often and has been drinking a 6 pack of beer a day for the past 1-2 weeks. Mother reports patient has a hx of self harm but is unaware of any recent self. Mother reports patient has no prior psych admissions. Mother reports patient’s medication includes Zoloft and Wellbutrin

## 2023-06-07 NOTE — DIETITIAN INITIAL EVALUATION ADULT - ORAL INTAKE PTA/DIET HISTORY
Patient reports decreased appetite over the past 2-3 weeks due to her mental condition and increased EtOH use with beer; takes omega-3 fish oil 1000mg daily and vitamin D3 1000 IU daily as suggested by her outpatient psychiatrist. NKFA reported, ? sensitive to scallops.

## 2023-06-07 NOTE — DIETITIAN INITIAL EVALUATION ADULT - PERTINENT MEDS FT
MEDICATIONS  (STANDING):  lithium SR (LITHOBID) 300 milliGRAM(s) Oral two times a day  lithium SR (LITHOBID) 300 milliGRAM(s) Oral once    MEDICATIONS  (PRN):  acetaminophen     Tablet .. 650 milliGRAM(s) Oral every 6 hours PRN Mild Pain (1 - 3), Moderate Pain (4 - 6)  aluminum hydroxide/magnesium hydroxide/simethicone Suspension 30 milliLiter(s) Oral every 6 hours PRN Dyspepsia  diphenhydrAMINE 50 milliGRAM(s) Oral every 6 hours PRN agitation  diphenhydrAMINE Injectable 50 milliGRAM(s) IntraMuscular once PRN severe agitation  haloperidol     Tablet 5 milliGRAM(s) Oral every 6 hours PRN agitation  haloperidol    Injectable 5 milliGRAM(s) IntraMuscular Once PRN severe agitation  LORazepam     Tablet 2 milliGRAM(s) Oral every 6 hours PRN Agitation  LORazepam   Injectable 2 milliGRAM(s) IntraMuscular Once PRN severe agitation  magnesium hydroxide Suspension 30 milliLiter(s) Oral daily PRN Constipation  nicotine  Polacrilex Gum 2 milliGRAM(s) Oral every 2 hours PRN nicotine dependence without withdrawal

## 2023-06-07 NOTE — BH INPATIENT PSYCHIATRY ASSESSMENT NOTE - NSBHMETABOLIC_PSY_ALL_CORE_FT
BMI: BMI (kg/m2): 20.2 (06-06-23 @ 13:41)  HbA1c: A1C with Estimated Average Glucose Result: 4.7 % (06-07-23 @ 08:00)    Glucose:   BP: 107/61 (06-06-23 @ 12:08) (101/60 - 138/112)  Lipid Panel: Date/Time: 06-07-23 @ 08:00  Cholesterol, Serum: 170  Direct LDL: --  HDL Cholesterol, Serum: 110  Total Cholesterol/HDL Ration Measurement: --  Triglycerides, Serum: 113

## 2023-06-07 NOTE — BH INPATIENT PSYCHIATRY ASSESSMENT NOTE - MSE UNSTRUCTURED FT
Patient is alert and oriented to person place and time.   Appearance: Dressed appropriately, well groomed, good hygiene, no gross abnormalities   Behavior/Attitude: Good rapport with examiner, no psychomotor agitation or slowing   Speech: pressured speech, normal volume, increased rate, normal tone   Mood: "I feel fine"   Affect: congruent to mood, stable, high expressivity      Patient is alert and oriented to person place and time.   Appearance: Dressed appropriately, well groomed, good hygiene, no gross abnormalities   Behavior/Attitude: Good rapport with examiner, no psychomotor agitation or slowing   Speech: pressured speech, normal volume, increased rate, normal tone   Mood: "I feel fine"   Affect: congruent to mood, stable, high expressivity   Thought process: circumferential at times   Thought content: denies visual, tactile, and auditory hallucinations, ideas of reference present, so suicidal or homicidal ideation   cognition: good  memory: good   insight: fair    Judgement: fair    Patient is alert and oriented to person place and time.   Appearance: Dressed appropriately, well groomed, good hygiene, no gross abnormalities   Behavior/Attitude: Good rapport with examiner, no psychomotor agitation or slowing   Speech: pressured speech, normal volume, increased rate, normal tone   Mood: "I feel fine"   Affect: congruent to mood, stable, high expressivity   Thought process: circumferential at times   Thought content: denies visual, tactile, and auditory hallucinations, ideas of reference present, so suicidal or homicidal ideation   cognition: intact  memory: Intact  insight: fair    Judgement: fair

## 2023-06-07 NOTE — PSYCHIATRIC REHAB INITIAL EVALUATION - NSBHPRRECOMMEND_PSY_ALL_CORE
The writer met with the patient to orient her to psychiatric rehabilitation staff and services. As per the patient’s chart, she presented at the behest of her therapist for concern of reyna. As per the patient, she came to the hospital because she had a recent spiritual awakening and was going on the roof calling out to the angels she felt on her back and feeling overwhelmed by it. The writer established a psychiatric rehabilitation goal for the patient to work on over the next seven days. The patient’s psychiatric rehabilitation goal is to be able to identify the early signs of reyna (e.g. sleep and mood changes) and to employ coping strategies to minimize acting out for her manic symptoms goal. Over the next seven days, Psychiatric Rehabilitation staff will utilize individual psychotherapy and psychoeducation to assist the patient in reaching her treatment goal. The patient denied SI/HI/AH/VH.

## 2023-06-07 NOTE — BH INPATIENT PSYCHIATRY ASSESSMENT NOTE - HPI (INCLUDE ILLNESS QUALITY, SEVERITY, DURATION, TIMING, CONTEXT, MODIFYING FACTORS, ASSOCIATED SIGNS AND SYMPTOMS)
s a 23 yr old female living with her mother, father, and sister she has a past psychiatric history of depression, and has a past psychiatric hospitalization at [facility] for depression, she denies a any suicidal and homicidal ideation and admits to using alcohol, marijuana, and nicotine products, she has a past medical history of broken ankle and wrist from playing soccer and basketball.    says that her behavior started around May 29th 2023 when she found out that her uncle . Even though he had a history of cancer she said that his death was unexpected. This lead her to locking herself in her room, drinking beer, screaming and playing loud music. She says that on Saturday her mom burst into her room and told her she had to go to the hospital even though she did not want to go and her father did not think she needed to do. She says that after her uncle's death she felt like everything suddenly "made sense", she got only 2-3 hours of sleep every but did not feel tired and felt energetic.  says that during this time she felt herself taking on a lot of activities at one time and felt like she received signs that her uncle was with her like seeing her favorite bird (a cardinal) after his passing. She says that she does not feel easily distracted and she did not feel grandiose, she just felt like she acquired new knowledge and "wanted to share it with everyone". She does not have any suicidal or homicidal ideation, increased sexual desire or activity, and denies any increased impulsivity like spending a lot of money.  Her past hospitalization occurred at [facility] in 2019. She says she felt like a shell of herself leading to the hospitalization. She has depressed mood, loss of interest in things she once liked doing, she did not see any sleep or appetite changes. She says she did not have any suicidal or homicidal ideation but that she was overwhelmed with school and needed up having to take a break from school. She says that felt similarly when she took a break from her girlfriend Francheska from July - 2021 who she had been dating since Aug. 2018 and when she took medical leave from her job in Dec. 2022 because her work responsibilities became overwhelming and she felt that she was not qualified to do the level of work they gave her. She says that she started a new job with [employer] in 2023 and that she loved it there. She said that even though she is dating Francheska she fell in love with her co worker Jacquelyn. She says that She told Jacquelyn of her romantic feelings on Friday but Jacquelyn told her she (Jacquelyn) was  and they could not be together she says that she did not feel bad about this and it did not impact her too much since Jacquelyn is , with children and is older than her. She says that she has never felt periods of euphoria, decreased need for sleep, increased starting of several activities or people sending her signs before.   s a 23 yr old female living with her mother, father, and sister she has a past psychiatric history of depression, and has a past psychiatric hospitalization at [facility] for depression, she denies a any suicidal and homicidal ideation and admits to using alcohol, marijuana, and nicotine products, she has a past medical history of broken ankle and wrist from playing soccer and basketball.    says that her behavior started around May 29th 2023 when she found out that her uncle . Even though he had a history of cancer she said that his death was unexpected. This lead her to locking herself in her room, drinking beer, screaming and playing loud music. She says that on Saturday her mom burst into her room and told her she had to go to the hospital even though she did not want to go and her father did not think she needed to do. She says that after her uncle's death she felt like everything suddenly "made sense", she got only 2-3 hours of sleep every but did not feel tired and felt energetic.  says that during this time she felt herself taking on a lot of activities at one time and felt like she received signs that her uncle was with her like seeing her favorite bird (a cardinal) after his passing. She says that she does not feel easily distracted and she did not feel grandiose, she just felt like she acquired new knowledge and "wanted to share it with everyone". she felt like after her uncle's death everything started to make sense.  She does not have any suicidal or homicidal ideation, increased sexual desire or activity, and denies any increased impulsivity like spending a lot of money.  Her past hospitalization occurred at [facility] in . She says she felt like a shell of herself leading to the hospitalization. She has depressed mood, loss of interest in things she once liked doing, she did not see any sleep or appetite changes. She says she did not have any suicidal or homicidal ideation but that she was overwhelmed with school and needed up having to take a break from school. She says that felt similarly when she took a break from her girlfriend Francheska from July - 2021 who she had been dating since Aug. 2018 and when she took medical leave from her job in Dec. 2022 because her work responsibilities became overwhelming and she felt that she was not qualified to do the level of work they gave her. She says that she started a new job with [employer] in 2023 and that she loved it there. She said that even though she is dating Francheska she fell in love with her co worker Jacquelyn. She says that She told Jacquelyn of her romantic feelings on Friday but Jacquelyn told her she (Jacquelyn) was  and they could not be together she says that she did not feel bad about this and it did not impact her too much since Jacquelyn is , with children and is older than her. She says that she has never felt periods of euphoria, decreased need for sleep, increased starting of several activities or people sending her signs before.   s a 23 yr old female living with her mother, father, and sister she has a past psychiatric history of depression, and has a past psychiatric hospitalization at [facility] for depression, she currently takes Zoloft 100mg and was prescribed Wellbutrin but did not renew the perscription once it ended. she denies a any suicidal and homicidal ideation and admits to using alcohol, marijuana, and nicotine products, she has a past medical history of broken ankle and wrist from playing soccer and basketball.    says that her behavior started around May 29th 2023 when she found out that her uncle . Even though he had a history of cancer she said that his death was unexpected. This lead her to locking herself in her room, drinking beer, screaming and playing loud music. She says that on Saturday her mom burst into her room and told her she had to go to the hospital even though she did not want to go and her father did not think she needed to do. She says that after her uncle's death she felt like everything suddenly "made sense", she gets 3-4 hours of sleep every night but did not feel tired and felt energetic.  says that during this time she felt herself taking on a lot of activities at one time and felt like she received signs from her  uncle. She saw a cardinal near his grave and understood this to be a sign from him. She says that she does not feel easily distracted and she did not feel grandiose, she just felt like she acquired new knowledge and "wanted to share it with everyone". she felt like after her uncle's death everything started to make sense.  She does not have any suicidal or homicidal ideation, increased sexual desire or activity, and denies any increased impulsivity like spending a lot of money.  Her past hospitalization occurred at [facility] in 2019. She says she felt like a shell of herself leading to the hospitalization. She has depressed mood, loss of interest in things she once liked doing, she did not see any sleep or appetite changes. She says she did not have any suicidal or homicidal ideation but that she was overwhelmed with school and needed up having to take a break from school. She says that she felt similarly when she took a break from her girlfriend Francheska from July - 2021 who she had been dating since Aug. 2018 and when she took medical leave from her job in Dec. 2022 because her work responsibilities became overwhelming and she felt that she was not qualified to do the level of work they gave her. She says that she started a new job with [employer] in 2023 and that she loved it there. She said that even though she is dating Francheska she fell in love with her co worker Jacquelyn. Her crush on Jacquelyn began in 2023. She says that She told Jacquelyn of her romantic feelings on Friday but Jacquelyn told her she (Jacquelyn) was  and they could not be together. She says that she did not feel bad about this and it did not impact her too much since Jacquelyn is , with children and is older than her. She says that she has never felt periods of euphoria, decreased need for sleep, increased starting of several activities or people sending her signs before.

## 2023-06-07 NOTE — BH INPATIENT PSYCHIATRY ASSESSMENT NOTE - NSBHASSESSSUMMFT_PSY_ALL_CORE
This is a 22y/o woman with a past psychiatric history of anxiety presenting with reyna. While in the ED, the patient has been calm and cooperative, although emotionally labile with irritability.   On exam, patient is presenting manic, hyperverbal, pressured, grandiose, labile, and impulsive, and is experiencing referential delusions, magical thinking, decreased need for sleep, and increased goal-directed behavior, all of which has lasted for at least the last week.   Patient meets criteria for bipolar I disorder, current manic episode. In her current state, she presents a risk of harm to herself and others and, therefore, warrants inpatient psychiatric admission for acute stabilization and medication management.     Plan:   - admit to [facility] 2W on a 9.27 legal status  - routine checks, no CO indicated  - start Lithium 300mg BID to treat acute reyna  - group, milieu therapy  - SW to pursue discharge planning, seek collateral This is a 24y/o woman with a past psychiatric history of anxiety presenting with reyna. While in the ED, the patient has been calm and cooperative, although emotionally labile with irritability.   On exam, patient is presenting manic, hyperverbal, pressured, grandiose, labile, and impulsive, and is experiencing referential delusions, magical thinking, decreased need for sleep, and increased goal-directed behavior, all of which has lasted for at least the last week.   Patient meets criteria for bipolar I disorder, current manic episode. In her current state, she presents a risk of harm to herself and others and, therefore, warrants inpatient psychiatric admission for acute stabilization and medication management.     Plan:   - admit to [facility] 2W on a 9.27 legal status  - routine checks, no CO indicated  - start Lithium 300mg BID to treat acute reyna  -  PRN PO/IM haloperidol 5mg, lorazepam 2mg, and diphenhydramine 50mg for agitation  - group, milieu therapy  - SW to pursue discharge planning, seek collateral

## 2023-06-07 NOTE — PSYCHIATRIC REHAB INITIAL EVALUATION - NSBHALCSUBCHOICE_PSY_ALL_CORE
The pt reported to the writer she drinks alcohol socially and does not use other substances, however, per her chart, she smokes marijuana daily (2-3 bowls for 5 years) and used cocaine once in college; the pt also smokes vaping nicotine.

## 2023-06-07 NOTE — BH INPATIENT PSYCHIATRY ASSESSMENT NOTE - PATIENT'S CHIEF COMPLAINT
mom told her "I'm taking you to the hospital" she says "I do not think I need to be here" mom told her "I'm taking you to the hospital" she said "I do not think I need to go there" Her mother insisted on taking her to the hospital  she said "I do not think I need to go there"

## 2023-06-07 NOTE — BH INPATIENT PSYCHIATRY ASSESSMENT NOTE - NSTXMANICGOAL_PSY_ALL_CORE
Be able to identify the early signs of reyna (e.g. sleep and mood changes) and to employ coping strategies to minimize acting out

## 2023-06-07 NOTE — DIETITIAN INITIAL EVALUATION ADULT - NUTRITION DIAGNOSIS
"Please call son.   Triglycerides, a type of fat found in the bloodstream is high.  HDL (\"good cholesterol\") is low.  .  The LDL \"bad cholesterol\" is at goal level..  The blood chemistries (Basic metabolic panel) are all normal including electrolytes (salt balances in the blood) and blood glucose.  The creatinine, a blood test to measure kidney function is elevated indicating some decrease in kidney function.   This has not changed.   PLAN: OK for upcoming surgery.   No new changes in treatment recommended." yes...

## 2023-06-07 NOTE — DIETITIAN INITIAL EVALUATION ADULT - OTHER INFO
Patient is a 22 y/o female with PPHx of depression, anxiety, a past psychiatric hospitalization at [facility] for depression, admits to using alcohol, marijuana, and nicotine products, PMHx of broken ankle and wrist from playing soccer and basketball. Presenting at the behest of her therapist for concern of reyna.    Met with patient in the dining area today. Patient reports her appetite was poor yesterday but today her appetite improves with PO intake ~75% at breakfast this morning, states she is 'feeling better now.' Denies chewing/swallowing difficulties on current diet. Has no specific food preferences voiced today. Writer encouraged po intake as tolerated, patient verbalized understanding and amenable to trying Ensure Plus HP to optimize her po intake. Patient declined daily Multivitamin for now. Patient denies GI distress (nausea/vomiting/diarrhea/constipation) at this time.

## 2023-06-07 NOTE — BH INPATIENT PSYCHIATRY ASSESSMENT NOTE - CURRENT MEDICATION
MEDICATIONS  (STANDING):    MEDICATIONS  (PRN):  acetaminophen     Tablet .. 650 milliGRAM(s) Oral every 6 hours PRN Mild Pain (1 - 3), Moderate Pain (4 - 6)  aluminum hydroxide/magnesium hydroxide/simethicone Suspension 30 milliLiter(s) Oral every 6 hours PRN Dyspepsia  diphenhydrAMINE 50 milliGRAM(s) Oral every 6 hours PRN agitation  diphenhydrAMINE Injectable 50 milliGRAM(s) IntraMuscular once PRN severe agitation  haloperidol     Tablet 5 milliGRAM(s) Oral every 6 hours PRN agitation  haloperidol    Injectable 5 milliGRAM(s) IntraMuscular Once PRN severe agitation  LORazepam     Tablet 2 milliGRAM(s) Oral every 6 hours PRN Agitation  LORazepam   Injectable 2 milliGRAM(s) IntraMuscular Once PRN severe agitation  magnesium hydroxide Suspension 30 milliLiter(s) Oral daily PRN Constipation  nicotine  Polacrilex Gum 2 milliGRAM(s) Oral every 2 hours PRN nicotine dependence without withdrawal   MEDICATIONS  (STANDING):  lithium SR (LITHOBID) 300 milliGRAM(s) Oral once  lithium SR (LITHOBID) 300 milliGRAM(s) Oral two times a day    MEDICATIONS  (PRN):  acetaminophen     Tablet .. 650 milliGRAM(s) Oral every 6 hours PRN Mild Pain (1 - 3), Moderate Pain (4 - 6)  aluminum hydroxide/magnesium hydroxide/simethicone Suspension 30 milliLiter(s) Oral every 6 hours PRN Dyspepsia  diphenhydrAMINE 50 milliGRAM(s) Oral every 6 hours PRN agitation  diphenhydrAMINE Injectable 50 milliGRAM(s) IntraMuscular once PRN severe agitation  haloperidol     Tablet 5 milliGRAM(s) Oral every 6 hours PRN agitation  haloperidol    Injectable 5 milliGRAM(s) IntraMuscular Once PRN severe agitation  LORazepam     Tablet 2 milliGRAM(s) Oral every 6 hours PRN Agitation  LORazepam   Injectable 2 milliGRAM(s) IntraMuscular Once PRN severe agitation  magnesium hydroxide Suspension 30 milliLiter(s) Oral daily PRN Constipation  nicotine  Polacrilex Gum 2 milliGRAM(s) Oral every 2 hours PRN nicotine dependence without withdrawal   MEDICATIONS  (STANDING):  lithium SR (LITHOBID) 300 milliGRAM(s) Oral two times a day  lithium SR (LITHOBID) 300 milliGRAM(s) Oral once    MEDICATIONS  (PRN):  acetaminophen     Tablet .. 650 milliGRAM(s) Oral every 6 hours PRN Mild Pain (1 - 3), Moderate Pain (4 - 6)  aluminum hydroxide/magnesium hydroxide/simethicone Suspension 30 milliLiter(s) Oral every 6 hours PRN Dyspepsia  diphenhydrAMINE 50 milliGRAM(s) Oral every 6 hours PRN agitation  diphenhydrAMINE Injectable 50 milliGRAM(s) IntraMuscular once PRN severe agitation  haloperidol     Tablet 5 milliGRAM(s) Oral every 6 hours PRN agitation  haloperidol    Injectable 5 milliGRAM(s) IntraMuscular Once PRN severe agitation  LORazepam     Tablet 2 milliGRAM(s) Oral every 6 hours PRN Agitation  LORazepam   Injectable 2 milliGRAM(s) IntraMuscular Once PRN severe agitation  magnesium hydroxide Suspension 30 milliLiter(s) Oral daily PRN Constipation  nicotine  Polacrilex Gum 2 milliGRAM(s) Oral every 2 hours PRN nicotine dependence without withdrawal   MEDICATIONS  (STANDING):  lithium SR (LITHOBID) 300 milliGRAM(s) Oral two times a day    MEDICATIONS  (PRN):  acetaminophen     Tablet .. 650 milliGRAM(s) Oral every 6 hours PRN Mild Pain (1 - 3), Moderate Pain (4 - 6)  aluminum hydroxide/magnesium hydroxide/simethicone Suspension 30 milliLiter(s) Oral every 6 hours PRN Dyspepsia  diphenhydrAMINE 50 milliGRAM(s) Oral every 6 hours PRN agitation  diphenhydrAMINE Injectable 50 milliGRAM(s) IntraMuscular once PRN severe agitation  haloperidol     Tablet 5 milliGRAM(s) Oral every 6 hours PRN agitation  haloperidol    Injectable 5 milliGRAM(s) IntraMuscular Once PRN severe agitation  LORazepam     Tablet 2 milliGRAM(s) Oral every 6 hours PRN Agitation  LORazepam   Injectable 2 milliGRAM(s) IntraMuscular Once PRN severe agitation  magnesium hydroxide Suspension 30 milliLiter(s) Oral daily PRN Constipation  nicotine  Polacrilex Gum 2 milliGRAM(s) Oral every 2 hours PRN nicotine dependence without withdrawal

## 2023-06-07 NOTE — BH INPATIENT PSYCHIATRY ASSESSMENT NOTE - RISK ASSESSMENT
Patient has no current suicidal or homicidal ideation, she currently lives with her parents, is in a long term relationship, and has no access to firearms, she says she has thought about suicide in the past but knows she would never be the one to "end her own life", given all of this she is at low risk for suicide and at low risk for violence.   patient's acute manic symptoms such as ideas of reference, decreased need for sleep, make it necessary for her to be in the hospital for stabilization.

## 2023-06-07 NOTE — BH SOCIAL WORK INITIAL PSYCHOSOCIAL EVALUATION - NSBHTREATHXTYPE_PSY_ALL_CORE
HPI:  8/13/21,   Time: 4:32 PM EDT       Randy Early is a 53 y.o. female presenting to the ED for silly/acty goofy/? Confusion/hx uti with same, beginning 1 week ago. The complaint has been persistent, moderate in severity, and worsened by nothing. Hx limited due to down's syndrome, caregiver not great historian. Has hx shunt, unsure where done. States acting goofy/silly/laughing. States sx similar to uti in past.  No fever/chills/sweats/n/v.  Pt verbalizes no c/o to me. Had ct 3 days ago. Initially stated sx today, but then caregiver later said 1 week    Review of Systems:   Pertinent positives and negatives are stated within HPI, all other systems reviewed and are negative.          --------------------------------------------- PAST HISTORY ---------------------------------------------  Past Medical History:  has a past medical history of Arthritis, Down syndrome, Hypothyroidism, Osteoarthritis, Syncope, Thyroid disease, UTI (urinary tract infection), Varicose veins, and Wears glasses. Past Surgical History:  has a past surgical history that includes knee surgery; hip surgery; Toe Surgery; ECHO Compl W Dop Color Flow (5/14/2012); Tubal ligation; Total knee arthroplasty (Left, 08/11/2016); Abdominal adhesion surgery; Abdomen surgery; joint replacement; craniotomy (Right, 4/12/2019); Vena Cava Filter Placement (Bilateral, 4/22/2019); brain surgery; and Ventriculoperitoneal shunt (N/A, 11/12/2019). Social History:  reports that she has never smoked. She has never used smokeless tobacco. She reports that she does not drink alcohol and does not use drugs. Family History: family history includes Heart Disease in her maternal grandfather, maternal grandmother, paternal aunt, and paternal uncle. The patients home medications have been reviewed. Allergies: Patient has no known allergies.         ---------------------------------------------------PHYSICAL EXAM--------------------------------------    Constitutional/General: Alert and oriented x3, general appearance consistant with downs  Head: Normocephalic and atraumatic  Eyes: PERRL, EOMI, conjunctive normal, sclera non icteric  Mouth: Oropharynx clear, handling secretions, no trismus, no asymmetry of the posterior oropharynx or uvular edema  Neck: Supple, full ROM,   Respiratory: Lungs clear to auscultation bilaterally, no wheezes, rales, or rhonchi. Not in respiratory distress  Cardiovascular:  Regular rate. Regular rhythm. No murmurs, gallops, or rubs. 2+ distal pulses  Chest: No chest wall tenderness  GI:  Abdomen Soft, Non tender, Non distended. .   Musculoskeletal: Moves all extremities x 4. Warm and well perfused, no clubbing, cyanosis, or edema. Capillary refill <3 seconds  Integument: skin warm and dry. No rashes. Lymphatic: no lymphadenopathy noted  Neurologic: GCS 15, no focal deficits, symmetric strength 5/5 in the upper and lower extremities bilaterally  Psychiatric: Normal Affect    -------------------------------------------------- RESULTS -------------------------------------------------  I have personally reviewed all laboratory and imaging results for this patient. Results are listed below.      LABS:  Results for orders placed or performed during the hospital encounter of 08/13/21   Urinalysis, reflex to microscopic   Result Value Ref Range    Color, UA Yellow Straw/Yellow    Clarity, UA Clear Clear    Glucose, Ur Negative Negative mg/dL    Bilirubin Urine Negative Negative    Ketones, Urine Negative Negative mg/dL    Specific Gravity, UA 1.015 1.005 - 1.030    Blood, Urine Negative Negative    pH, UA 7.0 5.0 - 9.0    Protein, UA Negative Negative mg/dL    Urobilinogen, Urine 0.2 <2.0 E.U./dL    Nitrite, Urine Negative Negative    Leukocyte Esterase, Urine Negative Negative   CBC Auto Differential   Result Value Ref Range    WBC 4.0 (L) 4.5 - 11.5 E9/L    RBC 4.96 3.50 - 5.50 E12/L Hemoglobin 15.9 (H) 11.5 - 15.5 g/dL    Hematocrit 46.7 34.0 - 48.0 %    MCV 94.2 80.0 - 99.9 fL    MCH 32.1 26.0 - 35.0 pg    MCHC 34.0 32.0 - 34.5 %    RDW 13.1 11.5 - 15.0 fL    Platelets 687 455 - 993 E9/L    MPV 9.3 7.0 - 12.0 fL    Neutrophils % 42.4 (L) 43.0 - 80.0 %    Immature Granulocytes % 0.3 0.0 - 5.0 %    Lymphocytes % 43.4 (H) 20.0 - 42.0 %    Monocytes % 9.3 2.0 - 12.0 %    Eosinophils % 3.3 0.0 - 6.0 %    Basophils % 1.3 0.0 - 2.0 %    Neutrophils Absolute 1.70 (L) 1.80 - 7.30 E9/L    Immature Granulocytes # 0.01 E9/L    Lymphocytes Absolute 1.73 1.50 - 4.00 E9/L    Monocytes Absolute 0.37 0.10 - 0.95 E9/L    Eosinophils Absolute 0.13 0.05 - 0.50 E9/L    Basophils Absolute 0.05 0.00 - 0.20 E9/L   Comprehensive Metabolic Panel w/ Reflex to MG   Result Value Ref Range    Sodium 141 132 - 146 mmol/L    Potassium reflex Magnesium 4.1 3.5 - 5.0 mmol/L    Chloride 105 98 - 107 mmol/L    CO2 29 22 - 29 mmol/L    Anion Gap 7 7 - 16 mmol/L    Glucose 92 74 - 99 mg/dL    BUN 18 6 - 20 mg/dL    CREATININE 1.0 0.5 - 1.0 mg/dL    GFR Non-African American 57 >=60 mL/min/1.73    GFR African American >60     Calcium 9.6 8.6 - 10.2 mg/dL    Total Protein 7.2 6.4 - 8.3 g/dL    Albumin 3.4 (L) 3.5 - 5.2 g/dL    Total Bilirubin 0.5 0.0 - 1.2 mg/dL    Alkaline Phosphatase 106 (H) 35 - 104 U/L    ALT 31 0 - 32 U/L    AST 30 0 - 31 U/L       RADIOLOGY:  Interpreted by Radiologist.  No orders to display       EKG: This EKG is signed and interpreted by the EP. Time:   Rate:   Rhythm:   Interpretation:   Comparison:       ------------------------- NURSING NOTES AND VITALS REVIEWED ---------------------------   The nursing notes within the ED encounter and vital signs as below have been reviewed by myself.   /68   Pulse 67   Temp 98.3 °F (36.8 °C) (Oral)   Resp 16   Ht 5' (1.524 m)   Wt 125 lb (56.7 kg)   SpO2 98%   BMI 24.41 kg/m²   Oxygen Saturation Interpretation: Normal    The patients available past medical records and past encounters were reviewed. ------------------------------ ED COURSE/MEDICAL DECISION MAKING----------------------  Medications - No data to display      ED COURSE:       Medical Decision Making: Odd sx, w/u neg, family refused ct as had one outpt that told was nml. ua neg, will cx, but abstain abx pending cx. Dc home      This patient's ED course included: a personal history and physicial examination    This patient has remained hemodynamically stable during their ED course. Re-Evaluations:             Re-evaluation. Patients symptoms show no change    Re-examination  8/13/21   600 PM EDT          Vital Signs:   Vitals:    08/13/21 1604   BP: 116/68   Pulse: 67   Resp: 16   Temp: 98.3 °F (36.8 °C)   TempSrc: Oral   SpO2: 98%   Weight: 125 lb (56.7 kg)   Height: 5' (1.524 m)     Card/Pulm:  Rhythm: normal rate. Heart Sounds: no murmurs, gallops, or rubs. clear to auscultation, no wheezes or rales and unlabored breathing. Capillary Refill: normal.              Counseling: The emergency provider has spoken with the caregiver and discussed todays results, in addition to providing specific details for the plan of care and counseling regarding the diagnosis and prognosis. Questions are answered at this time and they are agreeable with the plan.       --------------------------------- IMPRESSION AND DISPOSITION ---------------------------------    IMPRESSION  1. Mood change        DISPOSITION  Disposition: Discharge to home  Patient condition is stable    NOTE: This report was transcribed using voice recognition software.  Every effort was made to ensure accuracy; however, inadvertent computerized transcription errors may be present        Anuj Licea MD  08/13/21 [id number] Therapy

## 2023-06-08 RX ORDER — LANOLIN ALCOHOL/MO/W.PET/CERES
5 CREAM (GRAM) TOPICAL AT BEDTIME
Refills: 0 | Status: DISCONTINUED | OUTPATIENT
Start: 2023-06-08 | End: 2023-06-13

## 2023-06-08 RX ADMIN — LITHIUM CARBONATE 300 MILLIGRAM(S): 300 TABLET, EXTENDED RELEASE ORAL at 20:11

## 2023-06-08 RX ADMIN — Medication 5 MILLIGRAM(S): at 22:35

## 2023-06-08 RX ADMIN — LITHIUM CARBONATE 300 MILLIGRAM(S): 300 TABLET, EXTENDED RELEASE ORAL at 08:49

## 2023-06-08 RX ADMIN — Medication 2 MILLIGRAM(S): at 04:09

## 2023-06-08 NOTE — BH INPATIENT PSYCHIATRY PROGRESS NOTE - CURRENT MEDICATION
MEDICATIONS  (STANDING):  lithium SR (LITHOBID) 300 milliGRAM(s) Oral two times a day    MEDICATIONS  (PRN):  acetaminophen     Tablet .. 650 milliGRAM(s) Oral every 6 hours PRN Mild Pain (1 - 3), Moderate Pain (4 - 6)  aluminum hydroxide/magnesium hydroxide/simethicone Suspension 30 milliLiter(s) Oral every 6 hours PRN Dyspepsia  diphenhydrAMINE 50 milliGRAM(s) Oral every 6 hours PRN agitation  diphenhydrAMINE Injectable 50 milliGRAM(s) IntraMuscular once PRN severe agitation  haloperidol     Tablet 5 milliGRAM(s) Oral every 6 hours PRN agitation  haloperidol    Injectable 5 milliGRAM(s) IntraMuscular Once PRN severe agitation  LORazepam     Tablet 2 milliGRAM(s) Oral every 6 hours PRN Agitation  LORazepam   Injectable 2 milliGRAM(s) IntraMuscular Once PRN severe agitation  magnesium hydroxide Suspension 30 milliLiter(s) Oral daily PRN Constipation  nicotine  Polacrilex Gum 2 milliGRAM(s) Oral every 2 hours PRN nicotine dependence without withdrawal

## 2023-06-08 NOTE — BH INPATIENT PSYCHIATRY PROGRESS NOTE - NSBHFUPINTERVALHXFT_PSY_A_CORE
No acute events overnight, Patient took all medications, Patient seen for evaluation and follow up of Bipolar I, patient care will be discussed in team for treatment and management of bipolar I.   says that she has slept better (slept for 7 hours) and increased appetite. Her mind feels a lot clearer. Prior to admission she felt as though things made sense but that she needed to "pre[place]". Now she feels that things are still a lot clearer but that desire to "share her knowledge" has calmed down. She denies increased impulsivity, denies having a flight of ideas, she still feels energetic but attributes this to increased sleep. She says that she is not easily distracted and that she feels very focused. While she still thinks the cardinals were a sign from her uncle she does not feel like any one or anybody is trying to reach out to her or send her signs wile in the unit. She feels a lot more " level headed". She does not show signs of any magical thinking and denies and ideas of reference.  She denies visual, tactile, and auditory hallucinations. She denies suicidal and homicidal ideation. She denies having increased impulsivity. Patient did say she cried herself to sleep since she had a visit with her girlfriend and she had to break the news of "being in love with someone else" to her girlfriend. She says that their relationship is still okay so while she is sad it is not impacting her severely.  No acute events overnight, Patient took all medications, Patient seen for evaluation and follow up of Bipolar I, patient care will be discussed in team for treatment and management of bipolar I.   says that she has slept better (slept for 7 hours) and increased appetite. Her mind feels a lot clearer. Prior to admission she felt as though things made sense and that she needed to "preach it from the [place]". Now she feels that things are still a lot clearer but that desire to "share her knowledge" has calmed down. She denies increased impulsivity, denies having a flight of ideas, she still feels energetic but attributes this to increased sleep. She says that she is not easily distracted and that she feels very focused. While she still thinks the cardinals were a sign from her uncle she does not feel like any one or anybody is trying to reach out to her or send her signs while in the unit. She feels a lot more " level headed". She does not show signs of any magical thinking and denies and ideas of reference. She said she felt like she was "one amongst the angels" yesterday, but today she does not feel like that today.  She denies visual, tactile, and auditory hallucinations. She denies suicidal and homicidal ideation. She denies having increased impulsivity but she did "professed her love" to her napoleon Valladares just a few days before being admitted to the ED. Patient did cry herself to sleep since she had a visit with her girlfriend and she had to break the news of "being in love with someone else" to her girlfriend. She says that their relationship is still okay so while she is sad it is not impacting her severely or causing her to have an altered mood.  No acute events overnight, Patient took all medications, Patient seen for evaluation and follow up of Bipolar I, patient care will be discussed in team for treatment and management of bipolar I.   says that she has slept better (slept for 7 hours) and increased appetite. Her mind feels a lot clearer. Prior to admission she felt as though things made sense and that she needed to "preach it from the mountain [place]". Now she feels that things are still a lot clearer and enlightened but that desire to "share her knowledge" has calmed down. She denies increased impulsivity, denies having a flight of ideas, she still feels energetic but attributes this to increased sleep. She says that she is not easily distracted and that she feels very focused. While she still thinks the cardinals were a sign from her uncle she does not feel like any one or anybody is trying to reach out to her or send her signs while in the unit. She feels a lot more " level headed". She shows less signs of magical thinking. She said she felt like she was "one amongst the angels" yesterday, but today she does not feel like that today.  She denies visual, tactile, and auditory hallucinations. She denies suicidal and homicidal ideation. She denies having increased impulsivity but she did "professed her love" to her napoleon Valladares just a few days before being admitted to the ED. Patient did cry herself to sleep since she had a visit with her girlfriend and she had to break the news of "being in love with someone else" to her girlfriend. She says that their relationship is still okay so while she is sad it is not impacting her severely or causing her to have an altered mood.

## 2023-06-08 NOTE — BH INPATIENT PSYCHIATRY PROGRESS NOTE - NSBHCHARTREVIEWVS_PSY_A_CORE FT
Vital Signs Last 24 Hrs  T(C): 36.7 (06-08-23 @ 07:19), Max: 36.7 (06-08-23 @ 07:19)  T(F): 98 (06-08-23 @ 07:19), Max: 98 (06-08-23 @ 07:19)  HR: --  BP: --  BP(mean): --  RR: 18 (06-08-23 @ 07:19) (18 - 18)  SpO2: 99% (06-08-23 @ 07:19) (99% - 99%)    Orthostatic VS  06-08-23 @ 07:19  Lying BP: --/-- HR: --  Sitting BP: 115/86 HR: 89  Standing BP: 120/73 HR: 99  Site: --  Mode: --  Orthostatic VS  06-07-23 @ 08:35  Lying BP: --/-- HR: --  Sitting BP: 127/87 HR: 70  Standing BP: 133/78 HR: 76  Site: --  Mode: --  Orthostatic VS  06-06-23 @ 13:41  Lying BP: --/-- HR: --  Sitting BP: 116/74 HR: 80  Standing BP: 120/84 HR: 95  Site: --  Mode: --   Vital Signs Last 24 Hrs  T(C): 36.7 (06-08-23 @ 07:19), Max: 36.7 (06-08-23 @ 07:19)  T(F): 98 (06-08-23 @ 07:19), Max: 98 (06-08-23 @ 07:19)  HR: --  BP: --  BP(mean): --  RR: 18 (06-08-23 @ 07:19) (18 - 18)  SpO2: 99% (06-08-23 @ 07:19) (99% - 99%)    Orthostatic VS  06-08-23 @ 07:19  Lying BP: --/-- HR: --  Sitting BP: 115/86 HR: 89  Standing BP: 120/73 HR: 99  Site: --  Mode: --  Orthostatic VS  06-07-23 @ 08:35  Lying BP: --/-- HR: --  Sitting BP: 127/87 HR: 70  Standing BP: 133/78 HR: 76  Site: --  Mode: --

## 2023-06-08 NOTE — BH INPATIENT PSYCHIATRY PROGRESS NOTE - PRN MEDS
MEDICATIONS  (PRN):  acetaminophen     Tablet .. 650 milliGRAM(s) Oral every 6 hours PRN Mild Pain (1 - 3), Moderate Pain (4 - 6)  aluminum hydroxide/magnesium hydroxide/simethicone Suspension 30 milliLiter(s) Oral every 6 hours PRN Dyspepsia  diphenhydrAMINE 50 milliGRAM(s) Oral every 6 hours PRN agitation  diphenhydrAMINE Injectable 50 milliGRAM(s) IntraMuscular once PRN severe agitation  haloperidol     Tablet 5 milliGRAM(s) Oral every 6 hours PRN agitation  haloperidol    Injectable 5 milliGRAM(s) IntraMuscular Once PRN severe agitation  LORazepam     Tablet 2 milliGRAM(s) Oral every 6 hours PRN Agitation  LORazepam   Injectable 2 milliGRAM(s) IntraMuscular Once PRN severe agitation  magnesium hydroxide Suspension 30 milliLiter(s) Oral daily PRN Constipation  nicotine  Polacrilex Gum 2 milliGRAM(s) Oral every 2 hours PRN nicotine dependence without withdrawal

## 2023-06-08 NOTE — BH CHART NOTE - NSEVENTNOTEFT_PSY_ALL_CORE
Spoke to patient's mother.     Mother says:   Patient has never been hospitalized for psychiatric emergency. she brought her to [facility] because of a depression and anxiety from college but was never admitted to an inpatient unit. She has a history of self injurious behavior (cutting) that started in middle school. She noticed a change in the patient behavior around her birthday ([birth year]) when she had a fight with her girlfriend. The patient had an increase in her alcohol intake, started journaling, screaming in her room and acting distant from her family. She was frightened about what the patient might do in this state and says that she just was not acting like herself since she was severely agitated. The patient at this time was very irritable and angry with her mother even though they had a close relationship. The patient also started saying that she knew the answer to everything and that she felt enlightened. The feelings of enlightenment started after her uncle's passing about a week later. at this time she was claiming to be able to speak to  relatives and pets. Patient's mother does not understand why the uncle's passing was so traumatic because  the uncle that passed was not particularly close to her family. She says she wants her daughter to be discharged on today or Friday (tomorrow).  Spoke to patient's mother.     Mother says:   Patient has never been hospitalized for psychiatric emergency. she brought her to [facility] because of a depression and anxiety from college but was never admitted to an inpatient unit. She has a history of self injurious behavior (cutting) that started in middle school. She noticed a change in the patient behavior around her birthday ([birth year]) when she had a fight with her girlfriend. The patient had an increase in her alcohol intake, started journaling, screaming in her room and acting distant from her family. She was frightened about what the patient might do in this state and says that she just was not acting like herself since she was severely agitated. The patient at this time was very irritable and angry with her mother even though they had a close relationship. The patient also started saying that she knew the answer to everything and that she felt enlightened. The feelings of enlightenment started after her uncle's passing about a week later. at this time she was claiming to be able to speak to  relatives and pets. Patient's mother does not understand why the uncle's passing was so traumatic because  the uncle that passed was not particularly close to her family.

## 2023-06-08 NOTE — BH INPATIENT PSYCHIATRY PROGRESS NOTE - MSE UNSTRUCTURED FT
Patient is alert and oriented to person place and time.   Appearance: Dressed appropriately, well groomed, good hygiene, no gross abnormalities   Behavior/Attitude: Good rapport with examiner, no psychomotor agitation or slowing   Speech: pressured speech, normal volume, increased rate, normal tone   Mood: "I feel fine"   Affect: congruent to mood, stable, high expressivity   Thought process: circumferential at times   Thought content: denies visual, tactile, and auditory hallucinations, ideas of reference present, so suicidal or homicidal ideation   cognition: intact  memory: Intact  insight: fair    Judgement: fair    Patient is alert and oriented to person place and time.   Appearance: Dressed appropriately, well groomed, good hygiene, no gross abnormalities   Behavior/Attitude: Good rapport with examiner, no psychomotor agitation or slowing   Speech: hyperverbal, normal volume, increased rate, normal tone   Mood: "I feel fine"   Affect: congruent to mood, stable, high expressivity   Thought process: circumferential at times   Thought content: denies visual, tactile, and auditory hallucinations,  no suicidal or homicidal ideation, less magical thinking   cognition: intact  memory: Intact  insight: fair    Judgement: fair    Patient is alert and oriented to person place and time.   Appearance: Dressed appropriately, well groomed, good hygiene, no gross abnormalities   Behavior/Attitude: Good rapport with examiner, some  psychomotor agitation, no psychomotor slowing   Speech: hyperverbal, normal volume, increased rate, normal tone   Mood: "I feel fine"   Affect: congruent to mood, stable, high expressivity   Thought process: circumferential at times   Thought content: denies visual, tactile, and auditory hallucinations,  no suicidal or homicidal ideation, less magical thinking (still some present)  cognition: intact  memory: Intact  insight: fair    Judgement: fair

## 2023-06-09 RX ORDER — NICOTINE POLACRILEX 2 MG
1 GUM BUCCAL DAILY
Refills: 0 | Status: DISCONTINUED | OUTPATIENT
Start: 2023-06-09 | End: 2023-06-13

## 2023-06-09 RX ORDER — NICOTINE POLACRILEX 2 MG
1 GUM BUCCAL ONCE
Refills: 0 | Status: COMPLETED | OUTPATIENT
Start: 2023-06-09 | End: 2023-06-09

## 2023-06-09 RX ORDER — LITHIUM CARBONATE 300 MG/1
300 TABLET, EXTENDED RELEASE ORAL DAILY
Refills: 0 | Status: DISCONTINUED | OUTPATIENT
Start: 2023-06-10 | End: 2023-06-13

## 2023-06-09 RX ORDER — LITHIUM CARBONATE 300 MG/1
600 TABLET, EXTENDED RELEASE ORAL AT BEDTIME
Refills: 0 | Status: DISCONTINUED | OUTPATIENT
Start: 2023-06-09 | End: 2023-06-13

## 2023-06-09 RX ADMIN — LITHIUM CARBONATE 600 MILLIGRAM(S): 300 TABLET, EXTENDED RELEASE ORAL at 21:25

## 2023-06-09 RX ADMIN — Medication 1 PATCH: at 13:59

## 2023-06-09 RX ADMIN — LITHIUM CARBONATE 300 MILLIGRAM(S): 300 TABLET, EXTENDED RELEASE ORAL at 09:21

## 2023-06-09 NOTE — BH INPATIENT PSYCHIATRY PROGRESS NOTE - NSBHMETABOLIC_PSY_ALL_CORE_FT
BMI: BMI (kg/m2): 20.2 (06-06-23 @ 13:41)  HbA1c: A1C with Estimated Average Glucose Result: 4.7 % (06-07-23 @ 08:00)    Glucose:   BP: 107/61 (06-06-23 @ 12:08) (107/61 - 107/61)  Lipid Panel: Date/Time: 06-07-23 @ 08:00  Cholesterol, Serum: 170  Direct LDL: --  HDL Cholesterol, Serum: 110  Total Cholesterol/HDL Ration Measurement: --  Triglycerides, Serum: 113   BMI: BMI (kg/m2): 20.2 (06-06-23 @ 13:41)  HbA1c: A1C with Estimated Average Glucose Result: 4.7 % (06-07-23 @ 08:00)    Glucose:   BP: --  Lipid Panel: Date/Time: 06-07-23 @ 08:00  Cholesterol, Serum: 170  Direct LDL: --  HDL Cholesterol, Serum: 110  Total Cholesterol/HDL Ration Measurement: --  Triglycerides, Serum: 113

## 2023-06-09 NOTE — BH PSYCHOLOGY - GROUP THERAPY NOTE - NSPSYCHOLGRPGENPT_PSY_A_CORE FT
The group began with a mindfulness exercise focused on cultivating awareness. After the exercise, patients shared reflections based on their experiences.  For rest of group, patients engaged in discussions based on the correlation between feeling anxious and being in love and partnerships, how to build and maintain relationships, how to uphold boundaries in relationships and how to strike a balance in one’s physical, emotional and spiritual well-being. Group members actively participated in dialogue, sharing personal experiences, asking questions and in providing support, sharing strategies and techniques that’s worked out for them.  provided support, validation and psychoeducation about how the parasympathetic nervous system works and how to use DBT TIPP skills to activate it, and how to regulate emotions.

## 2023-06-09 NOTE — BH INPATIENT PSYCHIATRY PROGRESS NOTE - NSBHFUPINTERVALHXFT_PSY_A_CORE
No acute events overnight, patient seen and evaluated for bipolar I, patient care and treatment or bipolar I discussed with the team.       says she is much more accepting of her diagnosis and accepts that she might need to be in the unit.  denies visual, tactile, auditory hallucinations, denies feelings of enlightenment or feeling suddenly "clear minded". She admitted that she had been impulsive during her manic episode by professing her love to Jacquelyn (her co-worker) as she no longer feels the intense love she had for Jacquelyn at her job and admitted she is "embarrassed" that she left [employer] several pages of messages. She thinks that her infatuation with Jacquelyn may have been related to her bipolar diagnosis. She denies seeing signs from her uncle since being in the unit and says that she feels calmer.  understands why her mother was concerned.  Her sleep has improved (slept 8 hours last night) and her appetite has improved. She says that she is worried about her job and what will happen after she come out of the unit. She denies suicidal or homicidal ideation.

## 2023-06-09 NOTE — BH PSYCHOLOGY - GROUP THERAPY NOTE - TOKEN PULL-DIAGNOSIS
Primary Diagnosis:  Bipolar I disorder with reyna [F31.10]      Severe manic bipolar 1 disorder with psychotic behavior [F31.2]      Bipolar 1 disorder, mixed, severe [F31.63]        Problem Dx:   Bipolar I disorder with reyna [F31.10]

## 2023-06-09 NOTE — BH INPATIENT PSYCHIATRY PROGRESS NOTE - NSBHCHARTREVIEWVS_PSY_A_CORE FT
Vital Signs Last 24 Hrs  T(C): 36.6 (06-09-23 @ 07:22), Max: 36.6 (06-09-23 @ 07:22)  T(F): 97.8 (06-09-23 @ 07:22), Max: 97.8 (06-09-23 @ 07:22)  HR: --  BP: --  BP(mean): --  RR: 19 (06-09-23 @ 07:22) (19 - 19)  SpO2: --    Orthostatic VS  06-09-23 @ 07:22  Lying BP: --/-- HR: --  Sitting BP: 121/75 HR: 75  Standing BP: 118/67 HR: 84  Site: --  Mode: --  Orthostatic VS  06-08-23 @ 07:19  Lying BP: --/-- HR: --  Sitting BP: 115/86 HR: 89  Standing BP: 120/73 HR: 99  Site: --  Mode: --

## 2023-06-09 NOTE — BH INPATIENT PSYCHIATRY PROGRESS NOTE - PRN MEDS
MEDICATIONS  (PRN):  acetaminophen     Tablet .. 650 milliGRAM(s) Oral every 6 hours PRN Mild Pain (1 - 3), Moderate Pain (4 - 6)  aluminum hydroxide/magnesium hydroxide/simethicone Suspension 30 milliLiter(s) Oral every 6 hours PRN Dyspepsia  diphenhydrAMINE 50 milliGRAM(s) Oral every 6 hours PRN agitation  diphenhydrAMINE Injectable 50 milliGRAM(s) IntraMuscular once PRN severe agitation  haloperidol     Tablet 5 milliGRAM(s) Oral every 6 hours PRN agitation  haloperidol    Injectable 5 milliGRAM(s) IntraMuscular Once PRN severe agitation  LORazepam     Tablet 2 milliGRAM(s) Oral every 6 hours PRN Agitation  LORazepam   Injectable 2 milliGRAM(s) IntraMuscular Once PRN severe agitation  magnesium hydroxide Suspension 30 milliLiter(s) Oral daily PRN Constipation  melatonin. 5 milliGRAM(s) Oral at bedtime PRN Insomnia  nicotine  Polacrilex Gum 2 milliGRAM(s) Oral every 2 hours PRN nicotine dependence without withdrawal

## 2023-06-09 NOTE — BH CHART NOTE - NSEVENTNOTEFT_PSY_ALL_CORE
Spoke to out-patient psychiatrist.   She will be away on vacation from June 15th -june 24th. If patient needs follow up care after discharge during these date she will need to have another psychiatrist.  She says the patient has a history of depression from problems in her relationship. the patient never showed any signs of bipolar and that she has no family history of bipolar. she said the patient stopped coming to her frequently before the pandemic started and then in the fall of 2022 she came back because she was severely depressed, she prescribed her sertraline in 2022 at that time.      She would rather patient be on lamotrigine for maintenance.

## 2023-06-09 NOTE — BH INPATIENT PSYCHIATRY PROGRESS NOTE - CURRENT MEDICATION
MEDICATIONS  (STANDING):  lithium SR (LITHOBID) 300 milliGRAM(s) Oral two times a day  nicotine -  14 mG/24Hr(s) Patch 1 Patch Transdermal daily    MEDICATIONS  (PRN):  acetaminophen     Tablet .. 650 milliGRAM(s) Oral every 6 hours PRN Mild Pain (1 - 3), Moderate Pain (4 - 6)  aluminum hydroxide/magnesium hydroxide/simethicone Suspension 30 milliLiter(s) Oral every 6 hours PRN Dyspepsia  diphenhydrAMINE 50 milliGRAM(s) Oral every 6 hours PRN agitation  diphenhydrAMINE Injectable 50 milliGRAM(s) IntraMuscular once PRN severe agitation  haloperidol     Tablet 5 milliGRAM(s) Oral every 6 hours PRN agitation  haloperidol    Injectable 5 milliGRAM(s) IntraMuscular Once PRN severe agitation  LORazepam     Tablet 2 milliGRAM(s) Oral every 6 hours PRN Agitation  LORazepam   Injectable 2 milliGRAM(s) IntraMuscular Once PRN severe agitation  magnesium hydroxide Suspension 30 milliLiter(s) Oral daily PRN Constipation  melatonin. 5 milliGRAM(s) Oral at bedtime PRN Insomnia  nicotine  Polacrilex Gum 2 milliGRAM(s) Oral every 2 hours PRN nicotine dependence without withdrawal   MEDICATIONS  (STANDING):  lithium SR (LITHOBID) 600 milliGRAM(s) Oral at bedtime  nicotine -  14 mG/24Hr(s) Patch 1 Patch Transdermal daily    MEDICATIONS  (PRN):  acetaminophen     Tablet .. 650 milliGRAM(s) Oral every 6 hours PRN Mild Pain (1 - 3), Moderate Pain (4 - 6)  aluminum hydroxide/magnesium hydroxide/simethicone Suspension 30 milliLiter(s) Oral every 6 hours PRN Dyspepsia  diphenhydrAMINE 50 milliGRAM(s) Oral every 6 hours PRN agitation  diphenhydrAMINE Injectable 50 milliGRAM(s) IntraMuscular once PRN severe agitation  haloperidol     Tablet 5 milliGRAM(s) Oral every 6 hours PRN agitation  haloperidol    Injectable 5 milliGRAM(s) IntraMuscular Once PRN severe agitation  LORazepam     Tablet 2 milliGRAM(s) Oral every 6 hours PRN Agitation  LORazepam   Injectable 2 milliGRAM(s) IntraMuscular Once PRN severe agitation  magnesium hydroxide Suspension 30 milliLiter(s) Oral daily PRN Constipation  melatonin. 5 milliGRAM(s) Oral at bedtime PRN Insomnia  nicotine  Polacrilex Gum 2 milliGRAM(s) Oral every 2 hours PRN nicotine dependence without withdrawal

## 2023-06-09 NOTE — BH INPATIENT PSYCHIATRY PROGRESS NOTE - NSBHASSESSSUMMFT_PSY_ALL_CORE
This is a 22y/o woman with a past psychiatric history of anxiety presenting with reyna. While in the ED, the patient has been calm and cooperative, although emotionally labile with irritability.  On exam, patient is presenting manic, hyperverbal, pressured, grandiose, labile, and impulsive, and is experiencing referential delusions, magical thinking, decreased need for sleep, and increased goal-directed behavior, all of which has lasted for at least the last week.   Patient meets criteria for bipolar I disorder, current manic episode. In her current state, she presents a risk of harm to herself and others and, therefore, warrants inpatient psychiatric admission for acute stabilization and medication management.     The patient is showing some improvement in symptoms, but continues to be manic.  She has been compliant with medications, tolerating them well.    Plan:   - admit to [facility] 2W on a 9.27 legal status  - routine checks, no CO indicated  - continue Lithium 300mg BID to treat acute reyna  -  PRN PO/IM haloperidol 5mg, lorazepam 2mg, and diphenhydramine 50mg for agitation  - group, milieu therapy  -  to pursue discharge planning, seek collateral

## 2023-06-09 NOTE — BH PSYCHOLOGY - GROUP THERAPY NOTE - NSBHPSYCHOLPARTICIPCOMMENT_PSY_A_CORE FT
Patient arrived on-time to group. Patient participated spontaneously by sharing insights consistent with the topic of discussion. Patient was cooperative, appeared to accept support from others, and offered support and validation to other participants.

## 2023-06-10 PROCEDURE — 99232 SBSQ HOSP IP/OBS MODERATE 35: CPT

## 2023-06-10 RX ADMIN — LITHIUM CARBONATE 300 MILLIGRAM(S): 300 TABLET, EXTENDED RELEASE ORAL at 09:59

## 2023-06-10 RX ADMIN — Medication 1 PATCH: at 10:08

## 2023-06-10 RX ADMIN — LITHIUM CARBONATE 600 MILLIGRAM(S): 300 TABLET, EXTENDED RELEASE ORAL at 20:48

## 2023-06-10 RX ADMIN — Medication 5 MILLIGRAM(S): at 20:48

## 2023-06-10 NOTE — BH INPATIENT PSYCHIATRY PROGRESS NOTE - NSBHASSESSSUMMFT_PSY_ALL_CORE
This is a 22y/o woman with a past psychiatric history of anxiety presenting with reyna. While in the ED, the patient has been calm and cooperative, although emotionally labile with irritability.  On exam, patient is presenting manic, hyperverbal, pressured, grandiose, labile, and impulsive, and is experiencing referential delusions, magical thinking, decreased need for sleep, and increased goal-directed behavior, all of which has lasted for at least the last week.   Patient meets criteria for bipolar I disorder, current manic episode. In her current state, she presents a risk of harm to herself and others and, therefore, warrants inpatient psychiatric admission for acute stabilization and medication management.     The patient is showing some improvement in symptoms, but continues to be manic.  She has been compliant with medications, tolerating them well.    Plan:   - admit to [facility] 2W on a 9.27 legal status  - routine checks, no CO indicated  - continue Lithium 300mg AM/ 600MG po qhs to treat acute reyna  -  PRN PO/IM haloperidol 5mg, lorazepam 2mg, and diphenhydramine 50mg for agitation  - group, milieu therapy  -  to pursue discharge planning, seek collateral

## 2023-06-10 NOTE — BH INPATIENT PSYCHIATRY PROGRESS NOTE - NSBHFUPINTERVALHXFT_PSY_A_CORE
Chart reviewed, case discussed with nursing staff.   Patient is pleasant and reports that she's feeling better. She states that she came to the hospital because she was getting signs from birds at her uncles  and had fallen in love with a coworker and her family became worried that she was manic. She admits that initially she was upset by her family calling her manic but has realized that she was engaging in some manic behavior but feels now that she's more balanced. Patient states that she's been sleeping intermittently, currently with a lot of energy after 2 hours of sleep and reports she's been reading a book and has been writing a lot about it. She proceeds to show writer multiple sheets of written notes. She reports she's been eating well on the unit. Adherent with Lithium 300mg po AM/600mg po qhs. denies any side effects.

## 2023-06-10 NOTE — BH INPATIENT PSYCHIATRY PROGRESS NOTE - CURRENT MEDICATION
MEDICATIONS  (STANDING):  lithium SR (LITHOBID) 300 milliGRAM(s) Oral daily  lithium SR (LITHOBID) 600 milliGRAM(s) Oral at bedtime  nicotine -  14 mG/24Hr(s) Patch 1 Patch Transdermal daily    MEDICATIONS  (PRN):  acetaminophen     Tablet .. 650 milliGRAM(s) Oral every 6 hours PRN Mild Pain (1 - 3), Moderate Pain (4 - 6)  aluminum hydroxide/magnesium hydroxide/simethicone Suspension 30 milliLiter(s) Oral every 6 hours PRN Dyspepsia  diphenhydrAMINE 50 milliGRAM(s) Oral every 6 hours PRN agitation  diphenhydrAMINE Injectable 50 milliGRAM(s) IntraMuscular once PRN severe agitation  haloperidol     Tablet 5 milliGRAM(s) Oral every 6 hours PRN agitation  haloperidol    Injectable 5 milliGRAM(s) IntraMuscular Once PRN severe agitation  LORazepam     Tablet 2 milliGRAM(s) Oral every 6 hours PRN Agitation  LORazepam   Injectable 2 milliGRAM(s) IntraMuscular Once PRN severe agitation  magnesium hydroxide Suspension 30 milliLiter(s) Oral daily PRN Constipation  melatonin. 5 milliGRAM(s) Oral at bedtime PRN Insomnia  nicotine  Polacrilex Gum 2 milliGRAM(s) Oral every 2 hours PRN nicotine dependence without withdrawal

## 2023-06-10 NOTE — BH INPATIENT PSYCHIATRY PROGRESS NOTE - MSE UNSTRUCTURED FT
Patient is alert and oriented to person place and time.   Appearance: Dressed appropriately, well groomed, good hygiene, no gross abnormalities   Behavior/Attitude: Good rapport with examiner, no psychomotor agitation, no psychomotor slowing   Speech: Talkative, normal volume, increased rate, normal tone   Mood: "I feel great"   Affect: elevated, increased expressivity   Thought process: circumferential at times   Thought content: denies visual, tactile, and auditory hallucinations,  no suicidal or homicidal ideation  cognition: intact  memory: Intact  insight: fair    Judgement: fair

## 2023-06-10 NOTE — BH INPATIENT PSYCHIATRY PROGRESS NOTE - NSBHCHARTREVIEWVS_PSY_A_CORE FT
Vital Signs Last 24 Hrs  T(C): 36.7 (06-10-23 @ 07:25), Max: 36.7 (06-10-23 @ 07:25)  T(F): 98 (06-10-23 @ 07:25), Max: 98 (06-10-23 @ 07:25)  HR: --  BP: --  BP(mean): --  RR: 18 (06-10-23 @ 07:25) (18 - 18)  SpO2: 99% (06-10-23 @ 07:25) (99% - 99%)    Orthostatic VS  06-10-23 @ 07:25  Lying BP: --/-- HR: --  Sitting BP: 118/70 HR: 67  Standing BP: 124/83 HR: 92  Site: --  Mode: --  Orthostatic VS  06-09-23 @ 07:22  Lying BP: --/-- HR: --  Sitting BP: 121/75 HR: 75  Standing BP: 118/67 HR: 84  Site: --  Mode: --

## 2023-06-11 DIAGNOSIS — F12.10 CANNABIS ABUSE, UNCOMPLICATED: ICD-10-CM

## 2023-06-11 PROCEDURE — 99232 SBSQ HOSP IP/OBS MODERATE 35: CPT

## 2023-06-11 RX ADMIN — Medication 1 PATCH: at 08:30

## 2023-06-11 RX ADMIN — Medication 1 PATCH: at 08:51

## 2023-06-11 RX ADMIN — Medication 1 PATCH: at 18:51

## 2023-06-11 RX ADMIN — LITHIUM CARBONATE 600 MILLIGRAM(S): 300 TABLET, EXTENDED RELEASE ORAL at 20:42

## 2023-06-11 RX ADMIN — Medication 5 MILLIGRAM(S): at 20:42

## 2023-06-11 RX ADMIN — LITHIUM CARBONATE 300 MILLIGRAM(S): 300 TABLET, EXTENDED RELEASE ORAL at 08:51

## 2023-06-11 NOTE — BH INPATIENT PSYCHIATRY PROGRESS NOTE - NSBHCONSBHPROVCNTCTNOFT_PSY_A_CORE
Needs f/u appt
contacted, will give us a call back 

## 2023-06-11 NOTE — BH INPATIENT PSYCHIATRY PROGRESS NOTE - NSBHMETABOLICLABS_PSY_ALL_CORE
Labs within last 12 months
All labs not within last 12 months, not ordered

## 2023-06-11 NOTE — BH INPATIENT PSYCHIATRY PROGRESS NOTE - NSBHASSESSSUMMFT_PSY_ALL_CORE
This is a 22y/o woman with a past psychiatric history of anxiety presenting with reyna. While in the ED, the patient has been calm and cooperative, although emotionally labile with irritability.  On exam, patient is presenting manic, hyperverbal, pressured, grandiose, labile, and impulsive, and is experiencing referential delusions, magical thinking, decreased need for sleep, and increased goal-directed behavior, all of which has lasted for at least the last week.   Patient meets criteria for bipolar I disorder, current manic episode. In her current state, she presents a risk of harm to herself and others and, therefore, warrants inpatient psychiatric admission for acute stabilization and medication management.     The patient is showing some improvement in symptoms, but continues to be manic.  She has been compliant with medications, tolerating them well.    Plan:   - admit to [facility] 2W on a 9.27 legal status  - routine checks, no CO indicated  - continue Lithium 300mg AM/ 600MG po qhs to treat acute reyna  -  PRN PO/IM haloperidol 5mg, lorazepam 2mg, and diphenhydramine 50mg for agitation  - group, milieu therapy  -  to pursue discharge planning, seek collateral

## 2023-06-11 NOTE — BH INPATIENT PSYCHIATRY PROGRESS NOTE - MSE UNSTRUCTURED FT
Patient is alert and oriented to person place and time.   Appearance: Dressed appropriately, well groomed, good hygiene, no gross abnormalities   Behavior/Attitude: Good rapport with examiner, no psychomotor agitation, no psychomotor slowing   Speech: Talkative, normal volume, increased rate, normal tone   Mood: "I feel great"   Affect: elevated, increased expressivity   Thought process: circumferential at times   Thought content: feels enlightened from her book, denies visual, tactile, and auditory hallucinations,  no suicidal or homicidal ideation  cognition: intact  memory: Intact  insight: fair    Judgement: fair

## 2023-06-11 NOTE — BH INPATIENT PSYCHIATRY PROGRESS NOTE - NSBHCHARTREVIEWVS_PSY_A_CORE FT
Vital Signs Last 24 Hrs  T(C): 37 (06-11-23 @ 07:40), Max: 37 (06-11-23 @ 07:40)  T(F): 98.6 (06-11-23 @ 07:40), Max: 98.6 (06-11-23 @ 07:40)  HR: --  BP: --  BP(mean): --  RR: 18 (06-11-23 @ 07:40) (18 - 18)  SpO2: 100% (06-11-23 @ 07:40) (100% - 100%)    Orthostatic VS  06-11-23 @ 07:40  Lying BP: --/-- HR: --  Sitting BP: 124/72 HR: 80  Standing BP: 120/70 HR: 76  Site: --  Mode: --  Orthostatic VS  06-10-23 @ 07:25  Lying BP: --/-- HR: --  Sitting BP: 118/70 HR: 67  Standing BP: 124/83 HR: 92  Site: --  Mode: --

## 2023-06-11 NOTE — BH INPATIENT PSYCHIATRY PROGRESS NOTE - CURRENT MEDICATION
MEDICATIONS  (STANDING):  lithium SR (LITHOBID) 600 milliGRAM(s) Oral at bedtime  lithium SR (LITHOBID) 300 milliGRAM(s) Oral daily  nicotine -  14 mG/24Hr(s) Patch 1 Patch Transdermal daily    MEDICATIONS  (PRN):  acetaminophen     Tablet .. 650 milliGRAM(s) Oral every 6 hours PRN Mild Pain (1 - 3), Moderate Pain (4 - 6)  aluminum hydroxide/magnesium hydroxide/simethicone Suspension 30 milliLiter(s) Oral every 6 hours PRN Dyspepsia  diphenhydrAMINE 50 milliGRAM(s) Oral every 6 hours PRN agitation  diphenhydrAMINE Injectable 50 milliGRAM(s) IntraMuscular once PRN severe agitation  haloperidol     Tablet 5 milliGRAM(s) Oral every 6 hours PRN agitation  haloperidol    Injectable 5 milliGRAM(s) IntraMuscular Once PRN severe agitation  LORazepam     Tablet 2 milliGRAM(s) Oral every 6 hours PRN Agitation  LORazepam   Injectable 2 milliGRAM(s) IntraMuscular Once PRN severe agitation  magnesium hydroxide Suspension 30 milliLiter(s) Oral daily PRN Constipation  melatonin. 5 milliGRAM(s) Oral at bedtime PRN Insomnia  nicotine  Polacrilex Gum 2 milliGRAM(s) Oral every 2 hours PRN nicotine dependence without withdrawal

## 2023-06-11 NOTE — BH INPATIENT PSYCHIATRY PROGRESS NOTE - NSBHFUPINTERVALHXFT_PSY_A_CORE
Chart reviewed, case discussed with nursing staff.   Patient is pleasant and reports that she's feeling better.   She reports having a full nights sleep with the addition of melatonin 5mg po PRN.  This morning she continues to exhibit symptoms of reyna including pressured speech, elated mood and increased goal directed activity (writing continuously in her book). SHe has been adherent with Lithium 300mg/600mg po qhs and denies any side effects.

## 2023-06-11 NOTE — BH INPATIENT PSYCHIATRY PROGRESS NOTE - NSBHMETABOLIC_PSY_ALL_CORE_FT
BMI: BMI (kg/m2): 20.2 (06-06-23 @ 13:41)  HbA1c: A1C with Estimated Average Glucose Result: 4.7 % (06-07-23 @ 08:00)    Glucose:   BP: --  Lipid Panel: Date/Time: 06-07-23 @ 08:00  Cholesterol, Serum: 170  Direct LDL: --  HDL Cholesterol, Serum: 110  Total Cholesterol/HDL Ration Measurement: --  Triglycerides, Serum: 113

## 2023-06-12 LAB
ALBUMIN SERPL ELPH-MCNC: 4.8 G/DL — SIGNIFICANT CHANGE UP (ref 3.3–5)
ALP SERPL-CCNC: 54 U/L — SIGNIFICANT CHANGE UP (ref 40–120)
ALT FLD-CCNC: 15 U/L — SIGNIFICANT CHANGE UP (ref 4–33)
ANION GAP SERPL CALC-SCNC: 12 MMOL/L — SIGNIFICANT CHANGE UP (ref 7–14)
AST SERPL-CCNC: 20 U/L — SIGNIFICANT CHANGE UP (ref 4–32)
BILIRUB SERPL-MCNC: 0.5 MG/DL — SIGNIFICANT CHANGE UP (ref 0.2–1.2)
BUN SERPL-MCNC: 15 MG/DL — SIGNIFICANT CHANGE UP (ref 7–23)
CALCIUM SERPL-MCNC: 9.4 MG/DL — SIGNIFICANT CHANGE UP (ref 8.4–10.5)
CHLORIDE SERPL-SCNC: 102 MMOL/L — SIGNIFICANT CHANGE UP (ref 98–107)
CO2 SERPL-SCNC: 24 MMOL/L — SIGNIFICANT CHANGE UP (ref 22–31)
CREAT SERPL-MCNC: 0.84 MG/DL — SIGNIFICANT CHANGE UP (ref 0.5–1.3)
EGFR: 99 ML/MIN/1.73M2 — SIGNIFICANT CHANGE UP
GLUCOSE SERPL-MCNC: 99 MG/DL — SIGNIFICANT CHANGE UP (ref 70–99)
LITHIUM SERPL-MCNC: 0.6 MMOL/L — SIGNIFICANT CHANGE UP (ref 0.6–1.2)
POTASSIUM SERPL-MCNC: 4.3 MMOL/L — SIGNIFICANT CHANGE UP (ref 3.5–5.3)
POTASSIUM SERPL-SCNC: 4.3 MMOL/L — SIGNIFICANT CHANGE UP (ref 3.5–5.3)
PROT SERPL-MCNC: 7.6 G/DL — SIGNIFICANT CHANGE UP (ref 6–8.3)
SODIUM SERPL-SCNC: 138 MMOL/L — SIGNIFICANT CHANGE UP (ref 135–145)

## 2023-06-12 RX ORDER — LITHIUM CARBONATE 300 MG/1
3 TABLET, EXTENDED RELEASE ORAL
Qty: 90 | Refills: 0
Start: 2023-06-12 | End: 2023-07-11

## 2023-06-12 RX ORDER — LANOLIN ALCOHOL/MO/W.PET/CERES
1 CREAM (GRAM) TOPICAL
Qty: 30 | Refills: 0
Start: 2023-06-12 | End: 2023-07-11

## 2023-06-12 RX ADMIN — LITHIUM CARBONATE 600 MILLIGRAM(S): 300 TABLET, EXTENDED RELEASE ORAL at 20:25

## 2023-06-12 RX ADMIN — Medication 2 MILLIGRAM(S): at 08:50

## 2023-06-12 RX ADMIN — LITHIUM CARBONATE 300 MILLIGRAM(S): 300 TABLET, EXTENDED RELEASE ORAL at 08:50

## 2023-06-12 RX ADMIN — Medication 5 MILLIGRAM(S): at 21:51

## 2023-06-12 NOTE — BH INPATIENT PSYCHIATRY PROGRESS NOTE - NSBHASSESSSUMMFT_PSY_ALL_CORE
This is a 22y/o woman with a past psychiatric history of anxiety presenting with reyna. While in the ED, the patient has been calm and cooperative, although emotionally labile with irritability.  On exam, patient is presenting manic, hyperverbal, pressured, grandiose, labile, and impulsive, and is experiencing referential delusions, magical thinking, decreased need for sleep, and increased goal-directed behavior, all of which has lasted for at least the last week.   Patient meets criteria for bipolar I disorder, current manic episode. In her current state, she presents a risk of harm to herself and others and, therefore, warrants inpatient psychiatric admission for acute stabilization and medication management.     The patient is showing some improvement in symptoms, but continues to be manic.  She has been compliant with medications, tolerating them well.    Plan:   - admit to [facility] 2W on a 9.27 legal status  - routine checks, no CO indicated  - continue Lithium 300mg AM/ 600MG po qhs to treat acute reyna  -  PRN PO/IM haloperidol 5mg, lorazepam 2mg, and diphenhydramine 50mg for agitation  - group, milieu therapy  -  to pursue discharge planning, seek collateral

## 2023-06-12 NOTE — BH INPATIENT PSYCHIATRY PROGRESS NOTE - MSE UNSTRUCTURED FT
Patient is alert and oriented to person place and time.   Appearance: Dressed appropriately, well groomed, good hygiene, no gross abnormalities   Behavior/Attitude: Good rapport with examiner, no psychomotor agitation, no psychomotor slowing   Speech: hyperverbal, normal volume, increased rate, normal tone   Mood: "I feel frustrated"   Affect: elevated, increased expressivity, congruent to mood, intense at times   Thought process: circumferential at times   Thought content: feels enlightened from her book, denies visual, tactile, and auditory hallucinations,  no suicidal or homicidal ideation  cognition: intact  memory: Intact  insight: fair    Judgement: fair

## 2023-06-12 NOTE — BH INPATIENT PSYCHIATRY PROGRESS NOTE - NSBHFUPINTERVALHXFT_PSY_A_CORE
No acute events overnight. patient reports tolerating increased dose in lithium well. Patient seen and evaluated for the treatment of bipolar I (manic episode), patient care and treatment discussed with the team regarding her manic episode.      says that she is frustrated because she does not believe her symptoms warranted her length of stay in the unit. patient educated about reyna, legal status, and her presenting symptoms.  denies visual, tactile, auditory hallucinations. She denies suicidal ideation and homicidal ideation.  says that she does not feel like there are any signs in unit and denies being sent signs from  relatives, she still does retain the belief that the cardinal was a sign from her uncle at his grave site. She says she feels a lot calmer and clear minded. She says that she is starting to understand that her impulsivity (professing love to a co-worker) and feeling "enlightened/ knowing the answers to everything" were part of her manic episode. She reports that around the time of her uncle's death (may 29th) she believed that seeing signs such as the numbers 222 repeatedly was a sign and that she may still hold these beliefs. She is fixated on discharge before Wednesday (), but is much more accepting of her diagnosis (upon explanation of symptoms) however at the beginning of the session she thought that her diagnosis of reyna may have been wrong since the doctors haven't known her most of her life.

## 2023-06-12 NOTE — BH INPATIENT PSYCHIATRY PROGRESS NOTE - CURRENT MEDICATION
MEDICATIONS  (STANDING):  lithium SR (LITHOBID) 300 milliGRAM(s) Oral daily  lithium SR (LITHOBID) 600 milliGRAM(s) Oral at bedtime  nicotine -  14 mG/24Hr(s) Patch 1 Patch Transdermal daily    MEDICATIONS  (PRN):  acetaminophen     Tablet .. 650 milliGRAM(s) Oral every 6 hours PRN Mild Pain (1 - 3), Moderate Pain (4 - 6)  aluminum hydroxide/magnesium hydroxide/simethicone Suspension 30 milliLiter(s) Oral every 6 hours PRN Dyspepsia  diphenhydrAMINE 50 milliGRAM(s) Oral every 6 hours PRN agitation  diphenhydrAMINE Injectable 50 milliGRAM(s) IntraMuscular once PRN severe agitation  haloperidol     Tablet 5 milliGRAM(s) Oral every 6 hours PRN agitation  haloperidol    Injectable 5 milliGRAM(s) IntraMuscular Once PRN severe agitation  LORazepam     Tablet 2 milliGRAM(s) Oral every 6 hours PRN Agitation  LORazepam   Injectable 2 milliGRAM(s) IntraMuscular Once PRN severe agitation  magnesium hydroxide Suspension 30 milliLiter(s) Oral daily PRN Constipation  melatonin. 5 milliGRAM(s) Oral at bedtime PRN Insomnia  nicotine  Polacrilex Gum 2 milliGRAM(s) Oral every 2 hours PRN nicotine dependence without withdrawal   MEDICATIONS  (STANDING):    MEDICATIONS  (PRN):

## 2023-06-12 NOTE — BH DISCHARGE NOTE NURSING/SOCIAL WORK/PSYCH REHAB - PATIENT PORTAL LINK FT
You can access the FollowMyHealth Patient Portal offered by [facility] by registering at the following website: [email or web address]. By joining [employer]’s FollowMyHealth portal, you will also be able to view your health information using other applications (apps) compatible with our system.

## 2023-06-12 NOTE — BH DISCHARGE NOTE NURSING/SOCIAL WORK/PSYCH REHAB - NSDCPEWEB_GEN_ALL_CORE
[facility] for [facility] website --- [email or web address] --- [email or web address]

## 2023-06-12 NOTE — BH DISCHARGE NOTE NURSING/SOCIAL WORK/PSYCH REHAB - MODE OF TRANSPORTATION
Patient in OR the majority of today was unable to see or examine. Patient will be seen by [facility] tomorrow.    Isadora Fuentes MD  [facility]     Ambulatory

## 2023-06-12 NOTE — BH INPATIENT PSYCHIATRY PROGRESS NOTE - NSBHCHARTREVIEWVS_PSY_A_CORE FT
Vital Signs Last 24 Hrs  T(C): 36.8 (06-12-23 @ 07:11), Max: 36.8 (06-12-23 @ 07:11)  T(F): 98.2 (06-12-23 @ 07:11), Max: 98.2 (06-12-23 @ 07:11)  HR: --  BP: --  BP(mean): --  RR: 18 (06-12-23 @ 07:11) (18 - 18)  SpO2: 99% (06-12-23 @ 07:11) (99% - 99%)    Orthostatic VS  06-12-23 @ 07:11  Lying BP: --/-- HR: --  Sitting BP: 95/63 HR: 80  Standing BP: 100/63 HR: 80  Site: --  Mode: --  Orthostatic VS  06-11-23 @ 07:40  Lying BP: --/-- HR: --  Sitting BP: 124/72 HR: 80  Standing BP: 120/70 HR: 76  Site: --  Mode: --   Vital Signs Last 24 Hrs  T(C): --  T(F): --  HR: --  BP: --  BP(mean): --  RR: --  SpO2: --

## 2023-06-12 NOTE — BH DISCHARGE NOTE NURSING/SOCIAL WORK/PSYCH REHAB - NSDCPRGOAL_PSY_ALL_CORE
Upon admission, the patient presented to at the behest of her therapist for concern of reyna. The patient met her psychiatric rehabilitation goal to be able to identify the early signs of reyna (sleep and mood changes) and to employ coping strategies to minimize acting out for her manic symptoms goal. The patient reported having conversations with peers, going to groups, listening to music, being outside, reading, writing, and journaling as her coping skills. The patient is compliant with her medications, engages with her peers, and has good ADL’s and behavioral control. The patient denied SI/HI/AH/VH. The patient reported feeling excited to go home, seeing her dog, and spending time with her loved ones. Psychiatric Rehabilitation staff recommends that the patient engage in outpatient services for continued medication and symptom management.

## 2023-06-12 NOTE — BH DISCHARGE NOTE NURSING/SOCIAL WORK/PSYCH REHAB - DISCHARGE INSTRUCTIONS AFTERCARE APPOINTMENTS
In order to check the location, date, or time of your aftercare appointment, please refer to your Discharge Instructions Document given to you upon leaving the hospital.  If you have lost the instructions please call [phone]

## 2023-06-12 NOTE — BH INPATIENT PSYCHIATRY PROGRESS NOTE - PRN MEDS
MEDICATIONS  (PRN):  acetaminophen     Tablet .. 650 milliGRAM(s) Oral every 6 hours PRN Mild Pain (1 - 3), Moderate Pain (4 - 6)  aluminum hydroxide/magnesium hydroxide/simethicone Suspension 30 milliLiter(s) Oral every 6 hours PRN Dyspepsia  diphenhydrAMINE 50 milliGRAM(s) Oral every 6 hours PRN agitation  diphenhydrAMINE Injectable 50 milliGRAM(s) IntraMuscular once PRN severe agitation  haloperidol     Tablet 5 milliGRAM(s) Oral every 6 hours PRN agitation  haloperidol    Injectable 5 milliGRAM(s) IntraMuscular Once PRN severe agitation  LORazepam     Tablet 2 milliGRAM(s) Oral every 6 hours PRN Agitation  LORazepam   Injectable 2 milliGRAM(s) IntraMuscular Once PRN severe agitation  magnesium hydroxide Suspension 30 milliLiter(s) Oral daily PRN Constipation  melatonin. 5 milliGRAM(s) Oral at bedtime PRN Insomnia  nicotine  Polacrilex Gum 2 milliGRAM(s) Oral every 2 hours PRN nicotine dependence without withdrawal   MEDICATIONS  (PRN):

## 2023-06-12 NOTE — BH DISCHARGE NOTE NURSING/SOCIAL WORK/PSYCH REHAB - NSCDUDCCRISIS_PSY_A_CORE
.National Suicide Prevention Lifeline 3 [phone] 045-4588/.  [employer]  1 (114) LIFENET (337-1113)/.  [facility]  [phone]/.  [facility]  [address] [place] [address]  [phone]   Hours:  Monday through Friday from 9 AM to 3 PM

## 2023-06-12 NOTE — BH DISCHARGE NOTE NURSING/SOCIAL WORK/PSYCH REHAB - NSDCPEEMAIL_GEN_ALL_CORE
[facility] for Tobacco Control email [email or web address]

## 2023-06-13 VITALS — TEMPERATURE: 97 F | RESPIRATION RATE: 20 BRPM

## 2023-06-13 RX ADMIN — LITHIUM CARBONATE 300 MILLIGRAM(S): 300 TABLET, EXTENDED RELEASE ORAL at 08:24

## 2023-06-13 RX ADMIN — Medication 1 PATCH: at 08:24

## 2023-06-13 NOTE — BH INPATIENT PSYCHIATRY DISCHARGE NOTE - OTHER PAST PSYCHIATRIC HISTORY (INCLUDE DETAILS REGARDING ONSET, COURSE OF ILLNESS, INPATIENT/OUTPATIENT TREATMENT)
Pt is a 23 yr old female living with her mother, father, and sister with past psychiatric history of depression, one past psychiatric hospitalization at [facility] for depression, she currently takes Zoloft 100mg and was prescribed Wellbutrin but did not renew the prescription once it ended. she denies a any suicidal and homicidal ideation and admits to using alcohol, marijuana, and nicotine products, she has a past medical history of broken ankle and wrist from playing soccer and basketball.   Pt says that her behavior started around May 29th 2023 when she found out that her uncle . Even though he had a history of cancer she said that his death was unexpected. This lead her to locking herself in her room, drinking beer, screaming and playing loud music. She says that on Saturday her mom burst into her room and told her she had to go to the hospital even though she did not want to go and her father did not think she needed to do. She says that after her uncle's death she felt like everything suddenly "made sense", she gets 3-4 hours of sleep every night but did not feel tired and felt energetic. Pt says that during this time she felt herself taking on a lot of activities at one time and felt like she received signs from her  uncle. She saw a cardinal near his grave and understood this to be a sign from him. She says that she does not feel easily distracted and she did not feel grandiose, she just felt like she acquired new knowledge and "wanted to share it with everyone". she felt like after her uncle's death everything started to make sense.  She does not have any suicidal or homicidal ideation, increased sexual desire or activity, and denies any increased impulsivity like spending a lot of money.  Her past hospitalization occurred at [facility] in 2019. She says she felt like a shell of herself leading to the hospitalization in 2019. She had depressed mood, loss of interest in things she once liked doing, she did not see any sleep or appetite changes. She says she did not have any suicidal or homicidal ideation but that she was overwhelmed with school and needed up having to take a break from school. She says that she felt similarly when she took a break from her girlfriend Francheska from July - 2021 who she had been dating since Aug. 2018 and when she took medical leave from her job in Dec. 2022 because her work responsibilities became overwhelming and she felt that she was not qualified to do the level of work they gave her. She says that she started a new job with [employer] in 2023 and that she loved it there. She said that even though she is dating Francheska she fell in love with her co worker Jacquelyn. Her crush on Jacquelyn began in 2023. She says that She told Jacquelyn of her romantic feelings on Friday but Jacquelyn told her she (Jacquelyn) was  and they could not be together. She says that she did not feel bad about this and it did not impact her too much since Jacquelyn is , with children and is older than her. She says that she has never felt periods of euphoria, decreased need for sleep, increased starting of several activities or people sending her signs before.    She states that she has been seeing psychiatrists and therapists off and on since 7th grade when she began having "emotional difficulties." At that time, she started cutting herself with razors, stopping a few years ago. Since Dec 2002, she has been seeing Dr. Wanda To, who started her on sertraline for anxiety. She was titrated to 100mg daily and reports taking it consistently. She also reports starting bupropion but has not taken it for a few weeks because her prescription ran out.     Per pts mother -she reports on day of admit she (mom) found out that the patient had sent around 64 text messages since Saturday night at midnight to a coworker that she has a crush on. Mother reports patient has made statements being enlightened and mother describes the text as a stream of conscious. Mother reports patients texed included the patient stating “I have been awake since midnight thinking about you”, talks about seeing the #’s 7 and 5 and that they are signs from angels. Mother reports patient states in the text that she has nothing to fear, is talking about god and saying her uncle jono who recently passed has been talking to her since she passed. Mother reports the coworker told the mother today about the texts. Mother reports patient went to work today for a half of day and said she wasn’t going into work tomorrow. Mother reports patient says she has not slept since Saturday. She says patient’s hygiene and appetite are at baseline. Mother reports patient was agitated today yelling at family during the conversations. Mother reports being attacked verbally by the patient on the way here. Mother reports at baseline, the patient is punctual, friendly and not verbally aggressive. Mother reports patient smokes marijuana often and has been drinking a 6 pack of beer a day for the past 1-2 weeks. Mother reports patient has a hx of self harm but is unaware of any recent self. Mother reports patient has no prior psych admissions. Mother reports patient’s medication includes Zoloft and Wellbutrin

## 2023-06-13 NOTE — BH INPATIENT PSYCHIATRY PROGRESS NOTE - NSICDXBHPRIMARYDX_PSY_ALL_CORE
Severe manic bipolar 1 disorder with psychotic behavior   F31.2  
Bipolar I disorder with reyna   F31.10  
Severe manic bipolar 1 disorder with psychotic behavior   F31.2

## 2023-06-13 NOTE — BH INPATIENT PSYCHIATRY DISCHARGE NOTE - HPI (INCLUDE ILLNESS QUALITY, SEVERITY, DURATION, TIMING, CONTEXT, MODIFYING FACTORS, ASSOCIATED SIGNS AND SYMPTOMS)
s a 23 yr old female living with her mother, father, and sister she has a past psychiatric history of depression, and has a past psychiatric hospitalization at [facility] for depression, she currently takes Zoloft 100mg and was prescribed Wellbutrin but did not renew the perscription once it ended. she denies a any suicidal and homicidal ideation and admits to using alcohol, marijuana, and nicotine products, she has a past medical history of broken ankle and wrist from playing soccer and basketball.    says that her behavior started around May 29th 2023 when she found out that her uncle . Even though he had a history of cancer she said that his death was unexpected. This lead her to locking herself in her room, drinking beer, screaming and playing loud music. She says that on Saturday her mom burst into her room and told her she had to go to the hospital even though she did not want to go and her father did not think she needed to do. She says that after her uncle's death she felt like everything suddenly "made sense", she gets 3-4 hours of sleep every night but did not feel tired and felt energetic.  says that during this time she felt herself taking on a lot of activities at one time and felt like she received signs from her  uncle. She saw a cardinal near his grave and understood this to be a sign from him. She says that she does not feel easily distracted and she did not feel grandiose, she just felt like she acquired new knowledge and "wanted to share it with everyone". she felt like after her uncle's death everything started to make sense.  She does not have any suicidal or homicidal ideation, increased sexual desire or activity, and denies any increased impulsivity like spending a lot of money.  Her past hospitalization occurred at [facility] in 2019. She says she felt like a shell of herself leading to the hospitalization. She has depressed mood, loss of interest in things she once liked doing, she did not see any sleep or appetite changes. She says she did not have any suicidal or homicidal ideation but that she was overwhelmed with school and needed up having to take a break from school. She says that she felt similarly when she took a break from her girlfriend Francheska from July - 2021 who she had been dating since Aug. 2018 and when she took medical leave from her job in Dec. 2022 because her work responsibilities became overwhelming and she felt that she was not qualified to do the level of work they gave her. She says that she started a new job with [employer] in 2023 and that she loved it there. She said that even though she is dating Francheska she fell in love with her co worker Jacquelyn. Her crush on Jacquelyn began in 2023. She says that She told Jacquelyn of her romantic feelings on Friday but Jacuqelyn told her she (Jacquelyn) was  and they could not be together. She says that she did not feel bad about this and it did not impact her too much since Jacquelyn is , with children and is older than her. She says that she has never felt periods of euphoria, decreased need for sleep, increased starting of several activities or people sending her signs before.

## 2023-06-13 NOTE — BH INPATIENT PSYCHIATRY DISCHARGE NOTE - NSDCMRMEDTOKEN_GEN_ALL_CORE_FT
lithium 300 mg oral tablet, extended release: 3 tab(s) orally once a day x 30 days Take one tablet PO in the morning and 2 tablets PO at bedtime  melatonin 3 mg oral tablet: 1 tab(s) orally once a day (at bedtime) as needed for  insomnia

## 2023-06-13 NOTE — BH INPATIENT PSYCHIATRY PROGRESS NOTE - NSBHASSESSSUMMFT_PSY_ALL_CORE
This is a 24y/o woman with a past psychiatric history of anxiety presenting with reyna. While in the ED, the patient has been calm and cooperative, although emotionally labile with irritability.  On exam, patient is presenting manic, hyperverbal, pressured, grandiose, labile, and impulsive, and is experiencing referential delusions, magical thinking, decreased need for sleep, and increased goal-directed behavior, all of which has lasted for at least the last week.   Patient meets criteria for bipolar I disorder, current manic episode. In her current state, she presents a risk of harm to herself and others and, therefore, warrants inpatient psychiatric admission for acute stabilization and medication management.     The patient is showing some improvement in symptoms, but continues to be manic.  She has been compliant with medications, tolerating them well.    Plan:   - admit to [facility] 2[facility] on a 9.27 legal status  - routine checks, no CO indicated  - continue Lithium 300mg AM/ 600MG po qhs to treat acute reyna  -  PRN PO/IM haloperidol 5mg, lorazepam 2mg, and diphenhydramine 50mg for agitation  - group, milieu therapy  -  to pursue discharge planning, seek collateral

## 2023-06-13 NOTE — BH PSYCHOLOGY - CLINICIAN PSYCHOTHERAPY NOTE - NSBHPSYCHOLNARRATIVE_PSY_A_CORE FT
Writer met with patient for first individual psychotherapy session, which took place in the hallway of the unit. Writer and patient discussed patient's progress, discharge details, and outpatient plans as patient is anticipated to be discharged tomorrow, 06/13/2023. Patient reported to be discharge-focused. Patient fully completed the safety plan at this time and was informed that she will receive a copy of her safety plan at the time of her discharge. Patient was able to identify warning signs, coping strategies, a support network, and future-oriented goals. It was observed that patient was self-reflective and put thought and effort into thinking through and elaborating on her numerous responses throughout safety planning. Patient demonstrated future-oriented thinking and goals by discussing her short-term goals (patient reported that she will "take things slow" upon discharge, she looks forward to reconnecting with loved ones, and that she will continue her treatment as an outpatient) as well as long-term goals (patient reported that she loves to help others" and can " any book and be interested" in learning that topic). Patient reported feeling satisfied with the contented discussed in session and with her completed safety plan. Writer reviewed patient's identified coping strategies: activities from Dist[employer] with ACCEPTS (e.g., journaling, reading, singing, listening to music) as well as the temperature and exercise components of the TIPP skill (taking cold showers, lifting weights). Writer connected how such skills can be integrated with the Emotion Regulation skills of Build Mastery and [place] Ahead as patient moves forward in her treatment as an outpatient. Writer provided psychoeducation by assessing patient's readiness and willingness to engage in outpatient psychotherapy. Patient reported being open, willing, and motivated to continue her treatment with a mental health counselor, with whom patient reported to a close, positive rapport. Patient identified ways she can make her environment safe, such as practicing "balance and moderation" so that patient maintains her ADLs appropriately and remains medication compliant. Writer provided support and validation as well as reinforced the importance of patient continuing to practice identified skills in her day-to-day life as well as in her outpatient care. Patient was receptive to these interventions at this time. Writer then consulted with team about patient's reports and presentation in session.     Patient fully participated in psychotherapy session. Patient presented as open, cooperative, engaged, and well-related. Patient presented with average grooming and was casually dressed in her own clothes. It was observed that patient maintained appropriate eye contact. No abnormal motor movements noted and patient's speech was observed to be within normal limits. Patient presented as euthymic. Patient displayed affect which was congruent to her reported mood and which was appropriate to the topics discussed during session. It was observed that patient’s thought process was linear, coherent, logical, goal-oriented, and relevant to the topic at hand. No perceptual disturbances noted or observed. Patient did not endorse SI, intent, or plan at this time. Patient did not endorse any self-injurious thoughts, urges, intent, or plan for such at this time.

## 2023-06-13 NOTE — BH INPATIENT PSYCHIATRY PROGRESS NOTE - NSBHFUPINTERVALHXFT_PSY_A_CORE
no acute events overnight, patient took all medications, patient seen for the follow up and evaluation of Bipolar I (manic episode) Patient care, treatment and discharge will be discussed with the team.      denies suicidal ideation, thoughts of self injury, and homicidal ideation. She denies any hallucinations. She reports not "seeing signs everywhere or thinking that she has "the answer to everything". She denies feeling suddenly clear minded.  says that she is ready to get back to her home life. She does not have increased activity, thoughts or energy. She says that she has been sleeping for about 8-9 hours in a night and that her eating patterns have gotten much better since she can eat 3 meals a day.  hopes to cut back on marijuana and alcohol usage if she feels like they create vast changes in her mood.  feels a lot less elevated and feels that she has "calmed down".  Some magical thinking but it is clear it is in line with patient's own personal beliefs and personality.

## 2023-06-13 NOTE — BH INPATIENT PSYCHIATRY PROGRESS NOTE - NSBHATTESTCOMMENTATTENDFT_PSY_A_CORE
D/w treatment team, agree with plan as stated.
The patient continues to be manic, with some delusional thinking, pressured speech, and erratic sleep.  She has been compliant with medications, tolerating them well.  Will continue Li and check at steady state.
Discussed with interdisciplinary treatment team.  Agree with plan as stated.
Discussed with interdisciplinary treatment team.  Agree with plan as stated.

## 2023-06-13 NOTE — BH INPATIENT PSYCHIATRY PROGRESS NOTE - NSCGISEVERILLNESS_PSY_ALL_CORE
5 = Markedly ill - intrusive symptoms that distinctly impair social/occupational function or cause intrusive levels of distress 3 = Mildly ill – clearly established symptoms with minimal, if any, distress or difficulty in social and occupational function

## 2023-06-13 NOTE — BH INPATIENT PSYCHIATRY DISCHARGE NOTE - NSDCCPCAREPLAN_GEN_ALL_CORE_FT
PRINCIPAL DISCHARGE DIAGNOSIS  Diagnosis: Bipolar I disorder with reyna  Assessment and Plan of Treatment:

## 2023-06-13 NOTE — BH INPATIENT PSYCHIATRY PROGRESS NOTE - NSBHFUPINTERVALCCFT_PSY_A_CORE
"ready to go"
feels as if she "doesn't need to be here" 
I'm doing good
worried about her job 
I'm doing good
"Is discharge possible for tomorrow?"

## 2023-06-13 NOTE — BH INPATIENT PSYCHIATRY PROGRESS NOTE - NSBHCHARTREVIEWVS_PSY_A_CORE FT
Vital Signs Last 24 Hrs  T(C): 36.3 (06-13-23 @ 07:31), Max: 36.3 (06-13-23 @ 07:31)  T(F): 97.3 (06-13-23 @ 07:31), Max: 97.3 (06-13-23 @ 07:31)  HR: --  BP: --  BP(mean): --  RR: 20 (06-13-23 @ 07:31) (20 - 20)  SpO2: --    Orthostatic VS  06-13-23 @ 07:31  Lying BP: --/-- HR: --  Sitting BP: 110/63 HR: 62  Standing BP: 107/60 HR: 70  Site: --  Mode: --  Orthostatic VS  06-12-23 @ 07:11  Lying BP: --/-- HR: --  Sitting BP: 95/63 HR: 80  Standing BP: 100/63 HR: 80  Site: --  Mode: --   Vital Signs Last 24 Hrs  T(C): --  T(F): --  HR: --  BP: --  BP(mean): --  RR: --  SpO2: --

## 2023-06-13 NOTE — BH INPATIENT PSYCHIATRY PROGRESS NOTE - NSBHATTESTBILLING_PSY_A_CORE
40890-Vsnykqbhrw OBS or IP - moderate complexity OR 35-49 mins
29393-Krvgicfvzz OBS or IP - moderate complexity OR 35-49 mins
27159-Robaxibust OBS or IP - low complexity OR 25-34 mins
96233-Ltcirfmmhk OBS or IP - low complexity OR 25-34 mins
22008-Lcytawkptf OBS or IP - low complexity OR 25-34 mins
37849-Wkwlqadgqn OBS or IP - low complexity OR 25-34 mins

## 2023-06-13 NOTE — BH INPATIENT PSYCHIATRY PROGRESS NOTE - MSE UNSTRUCTURED FT
Patient is alert and oriented to person place and time.   Appearance: Dressed appropriately, well groomed, good hygiene, no gross abnormalities   Behavior/Attitude: Good rapport with examiner, no psychomotor agitation, no psychomotor slowing   Speech: hyperverbal, normal volume, increased rate, normal tone   Mood: "ready to go "   Affect: elevated, increased expressivity, congruent to mood, intense at times   Thought process: circumferential at times   Thought content: denies visual, tactile, and auditory hallucinations,  no suicidal or homicidal ideation  cognition: intact  memory: Intact  insight: fair    Judgement: fair

## 2023-06-13 NOTE — BH INPATIENT PSYCHIATRY PROGRESS NOTE - CURRENT MEDICATION
MEDICATIONS  (STANDING):  lithium SR (LITHOBID) 600 milliGRAM(s) Oral at bedtime  lithium SR (LITHOBID) 300 milliGRAM(s) Oral daily  nicotine -  14 mG/24Hr(s) Patch 1 Patch Transdermal daily    MEDICATIONS  (PRN):  acetaminophen     Tablet .. 650 milliGRAM(s) Oral every 6 hours PRN Mild Pain (1 - 3), Moderate Pain (4 - 6)  aluminum hydroxide/magnesium hydroxide/simethicone Suspension 30 milliLiter(s) Oral every 6 hours PRN Dyspepsia  diphenhydrAMINE 50 milliGRAM(s) Oral every 6 hours PRN agitation  diphenhydrAMINE Injectable 50 milliGRAM(s) IntraMuscular once PRN severe agitation  haloperidol     Tablet 5 milliGRAM(s) Oral every 6 hours PRN agitation  haloperidol    Injectable 5 milliGRAM(s) IntraMuscular Once PRN severe agitation  LORazepam     Tablet 2 milliGRAM(s) Oral every 6 hours PRN Agitation  LORazepam   Injectable 2 milliGRAM(s) IntraMuscular Once PRN severe agitation  magnesium hydroxide Suspension 30 milliLiter(s) Oral daily PRN Constipation  melatonin. 5 milliGRAM(s) Oral at bedtime PRN Insomnia  nicotine  Polacrilex Gum 2 milliGRAM(s) Oral every 2 hours PRN nicotine dependence without withdrawal   MEDICATIONS  (STANDING):    MEDICATIONS  (PRN):

## 2023-06-13 NOTE — BH INPATIENT PSYCHIATRY DISCHARGE NOTE - HOSPITAL COURSE
24y/o woman with a past psychiatric history of anxiety presenting with reyna. While in the ED, the patient has been calm and cooperative, although emotionally labile with irritability.  On exam, patient is presenting manic, hyperverbal, pressured, grandiose, labile, and impulsive, and is experiencing referential delusions, magical thinking, decreased need for sleep, and increased goal-directed behavior, all of which has lasted for at least the last week.   Patient meets criteria for bipolar I disorder, current manic episode. In her current state, she presents a risk of harm to herself and others and, therefore, warrants inpatient psychiatric admission for acute stabilization and medication management.       Aftercare Appointment  Any Rivera, therapist  14-Jun-2023 15:00  [address] [place], [place]  [phone]  Mental Health Treatment  Additional Aftercare Appointment  Dr To, psychiatrist  29-Jun-2023 12:00  [address] [place] [id number]  [phone]   24y/o woman with a past psychiatric history of anxiety presenting with reyna. While in the ED, the patient has been calm and cooperative, although emotionally labile with irritability.  On exam, patient is presenting manic, hyperverbal, pressured, grandiose, labile, and impulsive, and is experiencing referential delusions, magical thinking, decreased need for sleep, and increased goal-directed behavior, all of which has lasted for at least the last week.  Patient meets criteria for bipolar I disorder, current manic episode. In her current state, she presents a risk of harm to herself and others and, therefore, warrants inpatient psychiatric admission for acute stabilization and medication management.     Lithium was titrated to 900mg daily and the patient is showing improvement in symptoms.  She has been compliant with medications, tolerating them well.  No acute risk to self or others, stable for discharge.    - continue Lithium 300mg AM/ 600MG po qhs to treat acute reyna    Aftercare Appointment  Any Rivera, therapist  14-Jun-2023 15:00  [address] [place], [place]  [phone]  Mental Health Treatment  Additional Aftercare Appointment  Dr To, psychiatrist  29-Jun-2023 12:00  [address] [place] [address]  [phone]

## 2023-06-13 NOTE — BH INPATIENT PSYCHIATRY DISCHARGE NOTE - NSBHASSESSSUMMFT_PSY_ALL_CORE
This is a 24y/o woman with a past psychiatric history of anxiety presenting with reyna. While in the ED, the patient has been calm and cooperative, although emotionally labile with irritability.  On exam, patient is presenting manic, hyperverbal, pressured, grandiose, labile, and impulsive, and is experiencing referential delusions, magical thinking, decreased need for sleep, and increased goal-directed behavior, all of which has lasted for at least the last week.   Patient meets criteria for bipolar I disorder, current manic episode. In her current state, she presents a risk of harm to herself and others and, therefore, warrants inpatient psychiatric admission for acute stabilization and medication management.     The patient is showing some improvement in symptoms, but continues to be manic.  She has been compliant with medications, tolerating them well.    Plan:   - admit to [facility] 2W on a 9.27 legal status  - routine checks, no CO indicated  - continue Lithium 300mg AM/ 600MG po qhs to treat acute reyna  -  PRN PO/IM haloperidol 5mg, lorazepam 2mg, and diphenhydramine 50mg for agitation  - group, milieu therapy  -  to pursue discharge planning, seek collateral

## 2023-06-13 NOTE — BH INPATIENT PSYCHIATRY PROGRESS NOTE - NSBHATTESTTYPEVISIT_PSY_A_CORE
Attending Only
Attending with Resident/Fellow/Student
Attending Only
Attending with Resident/Fellow/Student

## 2023-06-13 NOTE — BH SAFETY PLAN - LOCAL URGENT CARE NAME
[facility] / [facility]

## 2023-06-13 NOTE — BH PSYCHOLOGY - CLINICIAN PSYCHOTHERAPY NOTE - TOKEN PULL-DIAGNOSIS
Primary Diagnosis:  Bipolar I disorder with reyna [F31.10]      Severe manic bipolar 1 disorder with psychotic behavior [F31.2]      Bipolar 1 disorder, mixed, severe [F31.63]        Problem Dx:   Cannabis abuse [F12.10]      Bipolar I disorder with reyna [F31.10]

## 2023-10-13 NOTE — BH CHART NOTE - NSEVENTNOTEFT_PSY_ALL_CORE
spoke to patient's mother.     She is ready for JR to be discharged and thinks JR looks a lot better. She does not want her to get more agitated or "worse" being in the unit for too long. Mom agreed that a discharge of Tuesday (6/13) will be good.  Size Of Lesion In Cm (Required): 0.5

## 2023-10-28 NOTE — BH PSYCHOLOGY - CLINICIAN PSYCHOTHERAPY NOTE - NSBHPSYCHOLPROBS_PSY_ALL_CORE
Academic/Vocational/Social Dysfunction/Impulsivity You can access the FollowMyHealth Patient Portal offered by [facility] by registering at the following website: [email or web address]. By joining [employer]’s FollowMyHealth portal, you will also be able to view your health information using other applications (apps) compatible with our system.

## 2024-04-30 ENCOUNTER — NON-APPOINTMENT (OUTPATIENT)
Age: 24
End: 2024-04-30

## 2024-06-26 ENCOUNTER — APPOINTMENT (OUTPATIENT)
Dept: GASTROENTEROLOGY | Facility: CLINIC | Age: 24
End: 2024-06-26
Payer: COMMERCIAL

## 2024-06-26 VITALS
HEIGHT: 61 IN | BODY MASS INDEX: 20.58 KG/M2 | OXYGEN SATURATION: 100 % | HEART RATE: 63 BPM | WEIGHT: 109 LBS | DIASTOLIC BLOOD PRESSURE: 64 MMHG | SYSTOLIC BLOOD PRESSURE: 112 MMHG

## 2024-06-26 DIAGNOSIS — K59.09 OTHER CONSTIPATION: ICD-10-CM

## 2024-06-26 PROCEDURE — 99203 OFFICE O/P NEW LOW 30 MIN: CPT

## 2024-06-26 RX ORDER — LINACLOTIDE 72 UG/1
72 CAPSULE, GELATIN COATED ORAL
Qty: 1 | Refills: 5 | Status: ACTIVE | COMMUNITY
Start: 2024-06-26 | End: 1900-01-01

## 2024-06-26 RX ORDER — LAMOTRIGINE 100 MG/1
100 TABLET ORAL
Refills: 0 | Status: ACTIVE | COMMUNITY

## 2024-07-25 ENCOUNTER — APPOINTMENT (OUTPATIENT)
Dept: GASTROENTEROLOGY | Facility: CLINIC | Age: 24
End: 2024-07-25

## 2025-07-15 ENCOUNTER — APPOINTMENT (OUTPATIENT)
Dept: ORTHOPEDIC SURGERY | Facility: CLINIC | Age: 25
End: 2025-07-15
Payer: COMMERCIAL

## 2025-07-15 VITALS — HEIGHT: 61 IN | BODY MASS INDEX: 21.14 KG/M2 | WEIGHT: 112 LBS

## 2025-07-15 PROCEDURE — 99203 OFFICE O/P NEW LOW 30 MIN: CPT

## 2025-07-15 PROCEDURE — 73590 X-RAY EXAM OF LOWER LEG: CPT | Mod: RT
